# Patient Record
Sex: MALE | Race: WHITE | Employment: FULL TIME | URBAN - NONMETROPOLITAN AREA
[De-identification: names, ages, dates, MRNs, and addresses within clinical notes are randomized per-mention and may not be internally consistent; named-entity substitution may affect disease eponyms.]

---

## 2019-08-19 ENCOUNTER — TELEPHONE (OUTPATIENT)
Dept: FAMILY MEDICINE CLINIC | Age: 24
End: 2019-08-19

## 2019-10-01 ENCOUNTER — TELEPHONE (OUTPATIENT)
Dept: ADMINISTRATIVE | Age: 24
End: 2019-10-01

## 2020-05-01 ENCOUNTER — HOSPITAL ENCOUNTER (OUTPATIENT)
Age: 25
Discharge: HOME | End: 2020-05-03
Payer: COMMERCIAL

## 2020-05-01 ENCOUNTER — OFFICE VISIT (OUTPATIENT)
Dept: PRIMARY CARE CLINIC | Age: 25
End: 2020-05-01
Payer: COMMERCIAL

## 2020-05-01 VITALS
TEMPERATURE: 98.4 F | DIASTOLIC BLOOD PRESSURE: 80 MMHG | SYSTOLIC BLOOD PRESSURE: 136 MMHG | HEART RATE: 81 BPM | BODY MASS INDEX: 33.57 KG/M2 | OXYGEN SATURATION: 98 % | HEIGHT: 75 IN | WEIGHT: 270 LBS

## 2020-05-01 PROCEDURE — 99213 OFFICE O/P EST LOW 20 MIN: CPT | Performed by: FAMILY MEDICINE

## 2020-05-01 PROCEDURE — U0003 INFECTIOUS AGENT DETECTION BY NUCLEIC ACID (DNA OR RNA); SEVERE ACUTE RESPIRATORY SYNDROME CORONAVIRUS 2 (SARS-COV-2) (CORONAVIRUS DISEASE [COVID-19]), AMPLIFIED PROBE TECHNIQUE, MAKING USE OF HIGH THROUGHPUT TECHNOLOGIES AS DESCRIBED BY CMS-2020-01-R: HCPCS

## 2020-05-01 NOTE — PROGRESS NOTES
Estefany [facility]  1995    Chief Complaint   Patient presents with    Other     Pt was exposed to a positive covid. Pt has no symptoms        Respiratory Symptoms:  Patient has no complaints. Pt was exposed to his father who was positive for covid. Symptoms have been stable with time. He denies any other symptoms . He has to be tested to go to work. Relevant PMH: No pertinent PMH. Smoking history:  He  reports that he has been smoking. He does not have any smokeless tobacco history on file. He has had ill contacts with covid. Treatment to date: none. Travel screen completed:  Yes          Vitals:    05/01/20 1038   BP: 136/80   Pulse: 81   Temp: 98.4 °F (36.9 °C)   TempSrc: Oral   SpO2: 98%   Weight: 270 lb (122.5 kg)   Height: 6' 3\" (1.905 m)      Physical Exam  Vitals signs and nursing note reviewed. Constitutional:       General: He is not in acute distress. Appearance: Normal appearance. He is normal weight. HENT:      Head: Normocephalic and atraumatic. Right Ear: Tympanic membrane normal.      Left Ear: Tympanic membrane normal.      Nose: No congestion or rhinorrhea. Mouth/Throat:      Mouth: Mucous membranes are moist.      Pharynx: Oropharynx is clear. No posterior oropharyngeal erythema. Eyes:      Pupils: Pupils are equal, round, and reactive to light. Neck:      Musculoskeletal: Normal range of motion and neck supple. Cardiovascular:      Rate and Rhythm: Normal rate and regular rhythm. Heart sounds: Normal heart sounds. Pulmonary:      Breath sounds: Normal breath sounds. Lymphadenopathy:      Cervical: No cervical adenopathy. Neurological:      Mental Status: He is alert. Assessment/Plan:  Zulema James was seen today for other. Diagnoses and all orders for this visit:    Exposure to COVID-19 virus  -     COVID-19 Ambulatory;  Future             Gonzalez Hyde MD  5/1/20     This visit was provided as a focused evaluation during

## 2020-05-01 NOTE — LETTER
[address]  Phone: [phone]  Fax: [phone]    Lisa Hernadez MD        May 1, 2020     Patient: Lauren Toledo   YOB: 1995   Date of Visit: 5/1/2020       To Whom It May Concern: It is my medical opinion that Lauren Toledo should remain out of work until [employer] Database (RCD[employer]) are back and recommendations are given. If you have any questions or concerns, please don't hesitate to call.     Sincerely,        Lisa Hernadez MD

## 2020-05-02 LAB
SARS-COV-2: NOT DETECTED
SOURCE: NORMAL

## 2020-05-05 ENCOUNTER — TELEPHONE (OUTPATIENT)
Dept: ADMINISTRATIVE | Age: 25
End: 2020-05-05

## 2020-05-05 NOTE — TELEPHONE ENCOUNTER
Called pt to schedule 10-14 day f/u from 5/1 flu clinic visit. Pt does not have a pcp and was willing to schedule a virtual visit with Dr. Valdez Sol as his pcp. Pt had seen Dr. Anya Han in the past but is not able to reschedule with him per previous telephone encounter. Appt scheduled for 5/15/20 with Dr. Valdez Sol.

## 2020-05-15 ENCOUNTER — VIRTUAL VISIT (OUTPATIENT)
Dept: FAMILY MEDICINE CLINIC | Age: 25
End: 2020-05-15
Payer: COMMERCIAL

## 2020-05-15 VITALS — BODY MASS INDEX: 33.75 KG/M2 | WEIGHT: 270 LBS | TEMPERATURE: 97.8 F

## 2020-05-15 PROCEDURE — 99213 OFFICE O/P EST LOW 20 MIN: CPT | Performed by: FAMILY MEDICINE

## 2020-05-15 SDOH — HEALTH STABILITY: MENTAL HEALTH: HOW OFTEN DO YOU HAVE A DRINK CONTAINING ALCOHOL?: NEVER

## 2021-11-27 ENCOUNTER — APPOINTMENT (OUTPATIENT)
Dept: CT IMAGING | Age: 26
End: 2021-11-27
Payer: COMMERCIAL

## 2021-11-27 ENCOUNTER — HOSPITAL ENCOUNTER (EMERGENCY)
Age: 26
Discharge: HOME | End: 2021-11-27
Attending: EMERGENCY MEDICINE
Payer: COMMERCIAL

## 2021-11-27 ENCOUNTER — APPOINTMENT (OUTPATIENT)
Dept: GENERAL RADIOLOGY | Age: 26
End: 2021-11-27
Payer: COMMERCIAL

## 2021-11-27 VITALS
HEART RATE: 68 BPM | SYSTOLIC BLOOD PRESSURE: 141 MMHG | OXYGEN SATURATION: 97 % | HEIGHT: 75 IN | DIASTOLIC BLOOD PRESSURE: 79 MMHG | TEMPERATURE: 97.8 F | RESPIRATION RATE: 16 BRPM | BODY MASS INDEX: 36.06 KG/M2 | WEIGHT: 290 LBS

## 2021-11-27 DIAGNOSIS — G43.809 OTHER MIGRAINE WITHOUT STATUS MIGRAINOSUS, NOT INTRACTABLE: Primary | ICD-10-CM

## 2021-11-27 LAB
ANION GAP SERPL CALCULATED.3IONS-SCNC: 11 MMOL/L (ref 7–16)
BASOPHILS ABSOLUTE: 0.08 E9/L (ref 0–0.2)
BASOPHILS RELATIVE PERCENT: 1.1 % (ref 0–2)
BUN BLDV-MCNC: 9 MG/DL (ref 6–20)
CALCIUM SERPL-MCNC: 9.3 MG/DL (ref 8.6–10.2)
CHLORIDE BLD-SCNC: 101 MMOL/L (ref 98–107)
CO2: 23 MMOL/L (ref 22–29)
CREAT SERPL-MCNC: 0.8 MG/DL (ref 0.7–1.2)
EOSINOPHILS ABSOLUTE: 0.19 E9/L (ref 0.05–0.5)
EOSINOPHILS RELATIVE PERCENT: 2.6 % (ref 0–6)
GFR AFRICAN AMERICAN: >60
GFR NON-AFRICAN AMERICAN: >60 ML/MIN/1.73
GLUCOSE BLD-MCNC: 82 MG/DL (ref 74–99)
HCT VFR BLD CALC: 43.4 % (ref 37–54)
HEMOGLOBIN: 14.7 G/DL (ref 12.5–16.5)
IMMATURE GRANULOCYTES #: 0.05 E9/L
IMMATURE GRANULOCYTES %: 0.7 % (ref 0–5)
INR BLD: 1.1
LYMPHOCYTES ABSOLUTE: 2.44 E9/L (ref 1.5–4)
LYMPHOCYTES RELATIVE PERCENT: 32.8 % (ref 20–42)
MCH RBC QN AUTO: 27.6 PG (ref 26–35)
MCHC RBC AUTO-ENTMCNC: 33.9 % (ref 32–34.5)
MCV RBC AUTO: 81.6 FL (ref 80–99.9)
MONOCYTES ABSOLUTE: 0.53 E9/L (ref 0.1–0.95)
MONOCYTES RELATIVE PERCENT: 7.1 % (ref 2–12)
NEUTROPHILS ABSOLUTE: 4.16 E9/L (ref 1.8–7.3)
NEUTROPHILS RELATIVE PERCENT: 55.7 % (ref 43–80)
PDW BLD-RTO: 12.5 FL (ref 11.5–15)
PLATELET # BLD: 337 E9/L (ref 130–450)
PMV BLD AUTO: 9.4 FL (ref 7–12)
POTASSIUM REFLEX MAGNESIUM: 4.2 MMOL/L (ref 3.5–5)
PROTHROMBIN TIME: 11.9 SEC (ref 9.3–12.4)
RBC # BLD: 5.32 E12/L (ref 3.8–5.8)
SODIUM BLD-SCNC: 135 MMOL/L (ref 132–146)
TROPONIN, HIGH SENSITIVITY: <6 NG/L (ref 0–11)
WBC # BLD: 7.5 E9/L (ref 4.5–11.5)

## 2021-11-27 PROCEDURE — 80048 BASIC METABOLIC PNL TOTAL CA: CPT

## 2021-11-27 PROCEDURE — 6370000000 HC RX 637 (ALT 250 FOR IP): Performed by: STUDENT IN AN ORGANIZED HEALTH CARE EDUCATION/TRAINING PROGRAM

## 2021-11-27 PROCEDURE — 71045 X-RAY EXAM CHEST 1 VIEW: CPT

## 2021-11-27 PROCEDURE — 70496 CT ANGIOGRAPHY HEAD: CPT

## 2021-11-27 PROCEDURE — 85025 COMPLETE CBC W/AUTO DIFF WBC: CPT

## 2021-11-27 PROCEDURE — 85610 PROTHROMBIN TIME: CPT

## 2021-11-27 PROCEDURE — 0042T CT BRAIN PERFUSION: CPT

## 2021-11-27 PROCEDURE — 70498 CT ANGIOGRAPHY NECK: CPT

## 2021-11-27 PROCEDURE — 93005 ELECTROCARDIOGRAM TRACING: CPT | Performed by: STUDENT IN AN ORGANIZED HEALTH CARE EDUCATION/TRAINING PROGRAM

## 2021-11-27 PROCEDURE — 96375 TX/PRO/DX INJ NEW DRUG ADDON: CPT

## 2021-11-27 PROCEDURE — 84484 ASSAY OF TROPONIN QUANT: CPT

## 2021-11-27 PROCEDURE — 70450 CT HEAD/BRAIN W/O DYE: CPT

## 2021-11-27 PROCEDURE — G0426 INPT/ED TELECONSULT50: HCPCS | Performed by: PSYCHIATRY & NEUROLOGY

## 2021-11-27 PROCEDURE — 2580000003 HC RX 258: Performed by: STUDENT IN AN ORGANIZED HEALTH CARE EDUCATION/TRAINING PROGRAM

## 2021-11-27 PROCEDURE — 6360000004 HC RX CONTRAST MEDICATION: Performed by: RADIOLOGY

## 2021-11-27 PROCEDURE — 6360000002 HC RX W HCPCS: Performed by: STUDENT IN AN ORGANIZED HEALTH CARE EDUCATION/TRAINING PROGRAM

## 2021-11-27 PROCEDURE — 96374 THER/PROPH/DIAG INJ IV PUSH: CPT

## 2021-11-27 PROCEDURE — 99285 EMERGENCY DEPT VISIT HI MDM: CPT

## 2021-11-27 RX ORDER — 0.9 % SODIUM CHLORIDE 0.9 %
1000 INTRAVENOUS SOLUTION INTRAVENOUS ONCE
Status: COMPLETED | OUTPATIENT
Start: 2021-11-27 | End: 2021-11-27

## 2021-11-27 RX ORDER — DIPHENHYDRAMINE HYDROCHLORIDE 50 MG/ML
25 INJECTION INTRAMUSCULAR; INTRAVENOUS ONCE
Status: COMPLETED | OUTPATIENT
Start: 2021-11-27 | End: 2021-11-27

## 2021-11-27 RX ORDER — ACETAMINOPHEN 325 MG/1
650 TABLET ORAL ONCE
Status: COMPLETED | OUTPATIENT
Start: 2021-11-27 | End: 2021-11-27

## 2021-11-27 RX ORDER — PROCHLORPERAZINE EDISYLATE 5 MG/ML
10 INJECTION INTRAMUSCULAR; INTRAVENOUS ONCE
Status: COMPLETED | OUTPATIENT
Start: 2021-11-27 | End: 2021-11-27

## 2021-11-27 RX ADMIN — DIPHENHYDRAMINE HYDROCHLORIDE 25 MG: 50 INJECTION, SOLUTION INTRAMUSCULAR; INTRAVENOUS at 15:40

## 2021-11-27 RX ADMIN — SODIUM CHLORIDE 1000 ML: 9 INJECTION, SOLUTION INTRAVENOUS at 15:40

## 2021-11-27 RX ADMIN — IOPAMIDOL 100 ML: 755 INJECTION, SOLUTION INTRAVENOUS at 16:01

## 2021-11-27 RX ADMIN — ACETAMINOPHEN 650 MG: 325 TABLET ORAL at 15:42

## 2021-11-27 RX ADMIN — PROCHLORPERAZINE EDISYLATE 10 MG: 5 INJECTION INTRAMUSCULAR; INTRAVENOUS at 15:40

## 2021-11-27 ASSESSMENT — PAIN SCALES - GENERAL
PAINLEVEL_OUTOF10: 0
PAINLEVEL_OUTOF10: 3

## 2021-11-27 ASSESSMENT — ENCOUNTER SYMPTOMS
BACK PAIN: 0
SORE THROAT: 0
ABDOMINAL PAIN: 0
RHINORRHEA: 0
VOMITING: 0
NAUSEA: 0
SHORTNESS OF BREATH: 0
DIARRHEA: 0
COUGH: 0

## 2021-11-27 ASSESSMENT — PAIN DESCRIPTION - PAIN TYPE: TYPE: ACUTE PAIN

## 2021-11-27 ASSESSMENT — PAIN DESCRIPTION - LOCATION: LOCATION: HEAD

## 2021-11-27 NOTE — VIRTUAL HEALTH
Consults  Patient Location:  [address] [facility] Emergency Department    Provider Location ([facility]): [place], [place]    This virtual visit was conducted via interactive/real-time audio/video. [address] [facility]Th Floor Stroke and Vascular Neurology Consult for  [address] Stroke Alert through [address] @ 3:27pm  11/27/2021 3:29 PM  Pt Name: Sebas Schwab  MRN: [id number]  [place]: 1995  Date of evaluation: 11/27/2021  Primary Care Physician: Cal Kwon MD  Reason for Evaluation: Stroke Evaluation with Discussion with Ed or primary team with Telemedicine and stroke evaluation with Review of imaging and labs    Sebas Schwab is a 32 y.o. male with no apparent medical hx, mom had MS and migraine, came in for blurry vision and headache. Pt described his headache as sharp pain on the left frontal head with vision where he saw colorful lines. He usually has headache 1-2 times per year. But nothing like this. At the time I saw pt, all his symptoms resolved. Pt has no complaints at this time. LKW:   NIH:  0    Allergies  has No Known Allergies. Medications  Prior to Admission medications    Not on File    Scheduled Meds:  Continuous Infusions:  PRN Meds:.  Past Medical History   has a past medical history of ADHD (attention deficit hyperactivity disorder) and Depression.   Social History  Social History     Socioeconomic History    Marital status: Single     Spouse name: Not on file    Number of children: Not on file    Years of education: Not on file    Highest education level: Not on file   Occupational History    Not on file   Tobacco Use    Smoking status: Former Smoker     Packs/day: 0.50     Years: 3.00     Pack years: 1.50    Smokeless tobacco: Never Used   Vaping Use    Vaping Use: Every day    Substances: Nicotine   Substance and Sexual Activity    Alcohol use: Never    Drug use: Never    Sexual activity: Yes Partners: Female     Comment: GF   Other Topics Concern    Not on file   Social History Narrative    Not on file     Social Determinants of Health     Financial Resource Strain:     Difficulty of Paying Living Expenses: Not on file   Food Insecurity:     Worried About Running Out of Food in the Last Year: Not on file    Leslie of Food in the Last Year: Not on file   Transportation Needs:     Lack of Transportation (Medical): Not on file    Lack of Transportation (Non-Medical):  Not on file   Physical Activity:     Days of Exercise per Week: Not on file    Minutes of Exercise per Session: Not on file   Stress:     Feeling of Stress : Not on file   Social Connections:     Frequency of Communication with Friends and Family: Not on file    Frequency of Social Gatherings with Friends and Family: Not on file    Attends Protestant Services: Not on file    Active Member of [address] [place], [place] [address] or Organizations: Not on file    Attends Club or Organization Meetings: Not on file    Marital Status: Not on file   Intimate Partner Violence:     Fear of Current or Ex-Partner: Not on file    Emotionally Abused: Not on file    Physically Abused: Not on file    Sexually Abused: Not on file   Housing Stability:     Unable to Pay for Housing in the Last Year: Not on file    Number of [place] in the Last Year: Not on file    Unstable Housing in the Last Year: Not on file     Family History      Problem Relation Age of Onset    Mult Sclerosis Mother     Breast Cancer Mother         remission    Diabetes Father     Arthritis Father     High Blood Pressure Father     High Cholesterol Father     Pancreatic Cancer Maternal Grandmother     High Blood Pressure Paternal Grandfather        OBJECTIVE  BP (!) 157/102   Pulse 76   Temp 97.8 °F (36.6 °C) (Temporal)   Resp 14   Ht 6' 3\" (1.905 m)   Wt 290 lb (131.5 kg)   SpO2 97%   BMI 36.25 kg/m²        Pre-Morbid mRS: 0    Imaging:  Images were personally reviewed with VIZ. AKILA used to review images including:  CT brain without contrast: normal  CTA imaging: normal    CTP: normal    Assessment    32year old man with migraine aura. Recommendations:  1. NIH 0  2. Recommend Outpatient Neurology Consult for further assessment and evaluation   3.  consider . [id number]  4. Not a tPA candidate, symptoms resolved  5. I believe this is most likely a migraine aura, despite how infrequent it happens to pt, pt has strong family hx of it  6. I recommend pt to f/u with PCP and neurologist as outpatient to monitor his migraine        Discussed with ED Physician Dr Krystal Floyd, Dr. Landon Yang    At least 54 min of Telemedicine and time in conversation directly with ED staff and physician for the patient who is in imminent and life threatening deterioration without further treatment and evaluation. This Virtual Visit was conducted with patient's (and/or legal guardian's) consent, to provide telestroke consultation and necessary medical care.   Time spent examining patient, reviewing the images personally, reviewing the chart, perform high complexity decision making and speaking with the nursing staff regarding recommendations        Juanito Phillips MD  Stroke, Neurocritical Care And/or [address] [place] [address] Stroke [address]   Electronically signed 11/27/2021 at 3:29 PM

## 2021-11-27 NOTE — ED NOTES
Radiology Procedure Waiver   Name: Ritesh Barney  : 1995  MRN: [id number]    Date:  21    Time: 3:37 PM EST    Benefits of immediately proceeding with radiology exam(s) without pre-testing outweigh the risks or are not indicated as specified below and therefore the following is/are being waived:    [x] Benefits of immediate radiology exam(s) outweigh any risk. OR    Pre-exam testing is not indicated for the following reason(s):  [] Pregnancy test   [] Patients LMP on-time and regular.   [] Patient had Tubal Ligation or has other Contraception Device. [] Patient  is Menopausal or Premenarcheal.    [] Patient had Full or Partial Hysterectomy. [] Protocol for CT contrast allegry   [] Patient has tolerated well previously   [] Patient does not have a true allergy    [] MRI Questionnaire     [x] BUN/Creatinine   [] Patient age w/no hx of renal dysfunction. [] Patient on Dialysis. [] Recent Normal Labs.   Electronically signed by Vinicius Crespo DO on 21 at 3:37 PM NAHOMI Zhu DO  Resident  [phone]

## 2021-11-27 NOTE — PROGRESS NOTES
[address] [facility] called, Jace Judge RN. Patient symptoms: headache, blurred vision, facial numbness. Last well known:  1230 today 11/27/21  RUST:  1    Dr. Jose Nation spoke with Dr. Aysha Snowden.

## 2021-11-27 NOTE — ED PROVIDER NOTES
[facility]  Department of Emergency Medicine     Written by: Charley Gosselin, DO  Patient Name: Macey Rosa  Attending Provider: No att. providers found  Admit Date: 2021  1:53 PM  MRN: [id number]                   : 1995        Chief Complaint   Patient presents with    Headache    Blurred Vision     right eye. started 1.5 hours ago. - Chief complaint    HPI       Patient is a 49-year-old male presenting for evaluation of sudden onset left-sided anterior headache and blurry vision with spots and colors. Symptoms have improved but on initial onset were severe in severity, made worse with stretching/running, no specific alleviating factors, and began while patient was working at [employer]. Currently patient endorses mild blurred vision out of his right eye, denies any confusion, motor deficits anywhere; he does endorse slight decrease sensation of the left side of his face. He has never experienced anything like this before, does states that he has had headaches in the past but they have not felt like this and he has never had these type of neurologic deficits. Family history significant for migraine headaches in his mother and MS in his mother. Patient denies any trauma, he has not had any fevers, nausea or vomiting, chest pain or palpitations, neck pain or stiffness. His gait is normal and he has not had any difficulty with coordination. Patient symptoms began about 2.5 hours prior to arrival.  States that his headache is dull and aching and throbbing. He has not tried any medications for this. Review of Systems   Constitutional: Negative for chills and fever. HENT: Negative for rhinorrhea and sore throat. Eyes: Positive for visual disturbance. Respiratory: Negative for cough and shortness of breath. Cardiovascular: Negative for chest pain and palpitations. Gastrointestinal: Negative for abdominal pain, diarrhea, nausea and vomiting.    Endocrine: less than 2 seconds. Coloration: Skin is not jaundiced or pale. Findings: No rash. Neurological:      General: No focal deficit present. Mental Status: He is alert and oriented to person, place, and time. Cranial Nerves: No dysarthria or facial asymmetry. Sensory: Sensory deficit (left side face sensation diminished (subjectively per patient)) present. Motor: No weakness. Coordination: Coordination normal.      Gait: Gait normal.   Psychiatric:         Mood and Affect: Mood normal.         Behavior: Behavior normal.          Procedures       [facility]     ED Course as of 11/27/21 1917   Sat Nov 27, 2021   1638 Spoke with telestroke provider, they feel that patient does not need any further evaluation, suspect that this was complex migraine and feel that he may be treated and discharged to follow-up outpatient with his PCP and/or neurology; they do not recommend any further inpatient evaluation or treatment and do not feel that he needs any emergent imaging or any further emergent treatment or evaluation. [VG]   1639 Patient was given migraine cocktail of IV fluids, Compazine, Benadryl, Tylenol [VG]   4465  with significant provement of symptoms. His repeat neurologic exam is also improved. [VG]      ED Course User Index  [VG] Al Diaz DO       [facility]      This is a 58-year-old male presenting for evaluation of headache with visual disturbance and decreased in station of the left side of his face. On arrival he is alert and oriented, he is not toxic in appearance, he does not appear to be in any acute distress.   On initial examination patient does not appear to have any focal neurologic deficits, however on extremes of gaze he does appear to have single variable beats of horizontal nystagmus mostly towards the right and does seem to have diplopia on upward gaze; pupils equal round reactive bilaterally, there is no neck stiffness, no dysarthria and no facial droop; he does endorse diminished sensation to the left side of his face in all dermatomes. Into these findings stroke alert was called given the window in which patient symptoms began; CT studies did not show any acute abnormality or perfusion defect or large vessel occlusion; patient symptoms did start to improve during this encounter, he was treated with migraine cocktail. He was evaluated by the telestroke neurologist who deemed that this patient is likely suffering from complex migraine, per telemetry stroke neurologist patient does not need further inpatient evaluation for the symptoms and may follow-up on an outpatient basis with either his PCP, neurologist, or both. Patient symptoms significantly improved after migraine cocktail of IV fluids, Compazine, Benadryl, Tylenol. His repeat examinations were also improved and no longer had any blurry vision or decreased sensation. Labs reviewed, are generally unremarkable. Given these findings, feel patient is appropriate for discharge with instruction for outpatient follow-up. Strict return precautions were discussed. He was referred to outpatient neurology. He was also instructed to follow-up by calling his PCP office tomorrow morning. Results and plan were discussed with the patient and his father who is at bedside, they voiced understanding and are amenable. I have discussed this patient with my attending, who has seen the patient and agrees with this disposition. Patient was seen and evaluated by myself and my attending Vitor Tapia MD. Assessment and Plan discussed with attending provider, please see attestation for final plan of care.       --------------------------------------------- PAST HISTORY ---------------------------------------------  Past Medical History:  has a past medical history of ADHD (attention deficit hyperactivity disorder) and Depression.     Past Surgical History:  has a past surgical history that includes [place] tooth extraction. Social History:  reports that he has quit smoking. He has a 1.50 pack-year smoking history. He has never used smokeless tobacco. He reports that he does not drink alcohol and does not use drugs. Family History: family history includes Arthritis in his father; Breast Cancer in his mother; Diabetes in his father; High Blood Pressure in his father and paternal grandfather; High Cholesterol in his father; Mult Sclerosis in his mother; Pancreatic Cancer in his maternal grandmother. The patients home medications have been reviewed. Allergies: Patient has no known allergies.     -------------------------------------------------- RESULTS -------------------------------------------------  Labs:  Results for orders placed or performed during the hospital encounter of 11/27/21   CBC Auto Differential   Result Value Ref Range    WBC 7.5 4.5 - 11.5 E9/L    RBC 5.32 3.80 - 5.80 E12/L    Hemoglobin 14.7 12.5 - 16.5 g/dL    Hematocrit 43.4 37.0 - 54.0 %    MCV 81.6 80.0 - 99.9 fL    MCH 27.6 26.0 - 35.0 pg    MCHC 33.9 32.0 - 34.5 %    RDW 12.5 11.5 - 15.0 fL    Platelets 841 508 - 423 E9/L    MPV 9.4 7.0 - 12.0 fL    Neutrophils % 55.7 43.0 - 80.0 %    Immature Granulocytes % 0.7 0.0 - 5.0 %    Lymphocytes % 32.8 20.0 - 42.0 %    Monocytes % 7.1 2.0 - 12.0 %    Eosinophils % 2.6 0.0 - 6.0 %    Basophils % 1.1 0.0 - 2.0 %    Neutrophils Absolute 4.16 1.80 - 7.30 E9/L    Immature Granulocytes # 0.05 E9/L    Lymphocytes Absolute 2.44 1.50 - 4.00 E9/L    Monocytes Absolute 0.53 0.10 - 0.95 E9/L    Eosinophils Absolute 0.19 0.05 - 0.50 E9/L    Basophils Absolute 0.08 0.00 - 0.20 I6/N   Basic Metabolic Panel w/ Reflex to MG   Result Value Ref Range    Sodium 135 132 - 146 mmol/L    Potassium reflex Magnesium 4.2 3.5 - 5.0 mmol/L    Chloride 101 98 - 107 mmol/L    CO2 23 22 - 29 mmol/L    Anion Gap 11 7 - 16 mmol/L    Glucose 82 74 - 99 mg/dL    BUN 9 6 - 20 mg/dL    CREATININE 0.8 0.7 - 1.2 mg/dL    GFR Non-African American >60 >=60 mL/min/1.73    GFR African American >60     Calcium 9.3 8.6 - 10.2 mg/dL   Troponin   Result Value Ref Range    Troponin, High Sensitivity <6 0 - 11 ng/L   Protime-INR   Result Value Ref Range    Protime 11.9 9.3 - 12.4 sec    INR 1.1    EKG 12 Lead   Result Value Ref Range    Ventricular Rate 81 BPM    Atrial Rate 81 BPM    P-R Interval 148 ms    QRS Duration 98 ms    Q-T Interval 394 ms    QTc Calculation (Bazett) 457 ms    P Axis 58 degrees    R Axis 19 degrees    T Axis 42 degrees       Radiology:  XR CHEST PORTABLE   Final Result   No evidence of active cardiopulmonary pathology. CTA HEAD W CONTRAST   Final Result   1. No perfusion mismatch. 2. Unremarkable CTA of the head. 3. Unremarkable CTA of the neck. CTA NECK W CONTRAST   Final Result   1. No perfusion mismatch. 2. Unremarkable CTA of the head. 3. Unremarkable CTA of the neck. CT BRAIN PERFUSION   Final Result   1. No perfusion mismatch. 2. Unremarkable CTA of the head. 3. Unremarkable CTA of the neck. CT HEAD WO CONTRAST   Final Result   No acute intracranial abnormality. EKG:  EKG interpreted by the emergency department physician, [phone]:    Rate is 81; rhythm is sinus; interpretation is NSR, normal axis, , QRS 98, , no ST elevations, no abnormal T wave inversions. No previous EKGs available for comparison. ------------------------- NURSING NOTES AND VITALS REVIEWED ---------------------------  Date / Time Roomed:  11/27/2021  1:53 PM  ED Bed Assignment:  [id number]    The nursing notes within the ED encounter and vital signs as below have been reviewed.    BP (!) 141/79   Pulse 68   Temp 97.8 °F (36.6 °C) (Temporal)   Resp 16   Ht 6' 3\" (1.905 m)   Wt 290 lb (131.5 kg)   SpO2 97%   BMI 36.25 kg/m²   Oxygen Saturation Interpretation: Normal      ------------------------------------------ PROGRESS NOTES ------------------------------------------  5:40 PM EST  I have spoken with the patient and his father and discussed todays results, in addition to providing specific details for the plan of care and counseling regarding the diagnosis and prognosis. Their questions are answered at this time and they are agreeable with the plan. I discussed at length with them reasons for immediate return here for re evaluation. They will followup with their neurology referral and primary care physician by calling their office on Monday.      --------------------------------- ADDITIONAL PROVIDER NOTES ---------------------------------  At this time the patient is without objective evidence of an acute process requiring hospitalization or inpatient management. They have remained hemodynamically stable throughout their entire ED visit and are stable for discharge with outpatient follow-up. The plan has been discussed in detail and they are aware of the specific conditions for emergent return, as well as the importance of follow-up. There are no discharge medications for this patient. Diagnosis:  1. Other migraine without status migrainosus, not intractable        Disposition:  Patient's disposition: Discharge to home  Patient's condition is stable.        Adriano Shelter, DO  Resident  11/29/21 0100

## 2021-11-27 NOTE — ED PROVIDER NOTES
ATTENDING PROVIDER ATTESTATION:     Colin Jones presented to the emergency department for evaluation of Headache and Blurred Vision (right eye. started 1.5 hours ago.)   and was initially evaluated by the Medical Resident. See Original ED Note for H&P and ED course above. I have reviewed and discussed the case, including pertinent history (medical, surgical, family and social) and exam findings with the Medical Resident assigned to Colin Jones. I have personally performed and/or participated in the history, exam, medical decision making, and procedures and agree with all pertinent clinical information and any additional changes or corrections are noted below in my assessment and plan. I have discussed this patient in detail with the resident, and provided the instruction and education,     I have reviewed my findings and recommendations with the assigned Medical Resident, Colin Jones and members of family present at the time of disposition. I have performed a history and physical examination of this patient and directly supervised the resident caring for this patient    HPI  This is a 68-year-old male with no past medical history presents to the emergency department today with a chief complaint of headache and blurred vision. The patient states that 12:30 PM he had sudden onset headache of the left frontal region. It is dull in nature. It is nonradiating. Stretching seems to worsen the pain but nothing improves it. He is not tried any medication. He does not have history of migraines and has had one headache that is somewhat similar in the past.  He denies any trauma. His symptoms have been constant. No numbness, tingling or weakness. Denies any drainage from the eyes. No recent fevers, cough, chest pain, shortness of breath or abdominal pain. He denies any photophobia or phonophobia. Of note, the patient's mother has history of MS and migraines.   The patient states this is not the worst headache he has had in his life. In regards to his blurred vision, he describes it as some brightness and blurring. This has improved however. ROS  A complete review of systems was performed and pertinent positives and negatives are stated within HPI, all other systems reviewed and are negative.    --------------------------------------------- PAST HISTORY ---------------------------------------------  Past Medical History:  has a past medical history of ADHD (attention deficit hyperactivity disorder) and Depression. Past Surgical History:  has a past surgical history that includes [place] tooth extraction. Social History:  reports that he has quit smoking. He has a 1.50 pack-year smoking history. He has never used smokeless tobacco. He reports that he does not drink alcohol and does not use drugs. Family History: family history includes Arthritis in his father; Breast Cancer in his mother; Diabetes in his father; High Blood Pressure in his father and paternal grandfather; High Cholesterol in his father; Mult Sclerosis in his mother; Pancreatic Cancer in his maternal grandmother. Unless otherwise noted, family history is non contributory    The patients home medications have been reviewed. Allergies: Patient has no known allergies. Physical Exam  General: The patient is conversational and lying comfortably in bed. Head: Normocephalic and atraumatic. Eyes: Sclera non-icteric and no conjunctival injection. Globes are nonfirm. No drainage. ENT: Nasal and oral membranes moist  Neck: Trachea midline. No JVD  Respiratory: Lungs clear to auscultation bilaterally. Patient speaking in full sentences. Cardiovascular: Regular rate. No pedal edema. Gastrointestinal:  Abdomen is soft and nondistended. Bowel sounds present. There is no tenderness. There is no guarding or rebound. Musculoskeletal: No deformity  Skin: Skin warm and dry. No rashes. Neurologic: No gross motor deficits.   No aphasia. Pupils are equal and reactive to light. Extraocular eye movements do show horizontal nystagmus with fast beat to the left when looking laterally as well as vertically. Decreased sensation over the left face. Sensation intact over the body. Symmetric facies. Tongue protrudes midline. 5 out of 5 symmetric strength of the upper and lower extremities. Negative finger-to-nose testing. Alert and oriented. No meningismus. Patient able to ambulate  Psychiatric: Normal affect. MDM  This is a 32 y.o. male presenting to the ED for headache, blurred vision. On initial presentation, the patient's vital signs are interpreted as hypertensive, afebrile and saturating well on room air. Based on history and physical exam, we have a broad differential.  We are concerned for complex migraine, however with the patient's slightly abnormal neurological exam we cannot exclude intracranial process including CVA, TIA, hemorrhage or MS. We will also assess for metabolic causes of his symptoms. The patient's headache will be symptomatically treated with Tylenol, Compazine and Benadryl. The patient will be hydrated with a liter bolus. Stroke code will be activated as the patient's NIH is 1. Will obtain CT of the head, CTA and CT perfusion. Will obtain laboratory studies and an EKG as well as a chest x-ray. Neurology will be consulted. A 12-lead EKG was performed and interpreted by myself. The rate is 81 with normal sinus rhythm and normal axis. There is no ectopy. The OH interval is 148, QRS interval is 98, and QTC interval is 457. Q-wave in lead III. No acute ST elevation or depression. Good R wave progression. This is normal sinus rhythm with nonspecific abnormality. That he show electrolytes within normal limits. Troponin is negative. No leukocytosis or anemia. Coags within normal limits. CT of the head shows no acute intracranial abnormality.   CTA unremarkable, CT perfusion unremarkable. Chest x-ray shows no acute cardiopulmonary process. This is interpreted by radiology. On reevaluation, the patient's symptoms have completely resolved. He no longer has a headache or blurred vision. Telestroke, Dr. Miley Champion evaluated the patient and feel he is appropriate for outpatient follow-up. He does not require an MRI at this time. He feels this is likely complex migraine. The patient is instructed to follow with primary care physician as well as neurology. He is given strict return precautions. He has elevated blood pressure today and will require recheck. He and his father verbalized understanding and are agreeable to the plan. I directly supervised any procedures performed by the resident and was present for the procedure including all critical portions of the procedure    The cardiac monitor revealed sinus rhythm with a heart rate in the 60s as interpreted by me. The cardiac monitor was ordered secondary to the patient's headache and to monitor the patient for dysrhythmia. CPT [id number]    I, Dr. Milinda Boxer, am the primary provider of record    Impression  1.  Other migraine without status migrainosus, not intractable              Milinda Boxer, MD  11/28/21 9625

## 2021-11-27 NOTE — ED NOTES
Bed:  [place] [address]  Expected date:   Expected time:   Means of arrival:   Comments:  Marah Chávez RN  11/27/21 0568

## 2021-11-28 LAB
EKG ATRIAL RATE: 81 BPM
EKG P AXIS: 58 DEGREES
EKG P-R INTERVAL: 148 MS
EKG Q-T INTERVAL: 394 MS
EKG QRS DURATION: 98 MS
EKG QTC CALCULATION (BAZETT): 457 MS
EKG R AXIS: 19 DEGREES
EKG T AXIS: 42 DEGREES
EKG VENTRICULAR RATE: 81 BPM

## 2021-12-03 ENCOUNTER — OFFICE VISIT (OUTPATIENT)
Dept: FAMILY MEDICINE CLINIC | Age: 26
End: 2021-12-03
Payer: COMMERCIAL

## 2021-12-03 VITALS
WEIGHT: 292 LBS | TEMPERATURE: 97.9 F | DIASTOLIC BLOOD PRESSURE: 84 MMHG | OXYGEN SATURATION: 98 % | HEART RATE: 72 BPM | RESPIRATION RATE: 16 BRPM | BODY MASS INDEX: 36.5 KG/M2 | SYSTOLIC BLOOD PRESSURE: 136 MMHG

## 2021-12-03 DIAGNOSIS — G43.809 OTHER MIGRAINE WITHOUT STATUS MIGRAINOSUS, NOT INTRACTABLE: Primary | ICD-10-CM

## 2021-12-03 PROCEDURE — 99213 OFFICE O/P EST LOW 20 MIN: CPT | Performed by: PHYSICIAN ASSISTANT

## 2021-12-03 NOTE — PROGRESS NOTES
Chief Complaint:  ED Follow-up (11/27/21 seen in ER for headache)    History of Present Illness:  Source of history provided by:  patient. Patient presents for ED follow up   Was diagnosed with migraine on 11/27  Had CT head, brain perfusion, and CTA head and neck which were all normal  Labs and CXR were also normal  Headache cocktail in the ED treated headache  Has been resolved since ED visit   Denies personal history of recurrent migraines   Denies acute concerns today    Review of Systems: Unless otherwise stated in this report or unable to obtain because of the patient's clinical or mental status as evidenced by the medical record, this patients's positive and negative responses for Review of Systems, constitutional, psych, eyes, ENT, cardiovascular, respiratory, gastrointestinal, neurological, genitourinary, musculoskeletal, integument systems and systems related to the presenting problem are either stated in the preceding or were not pertinent or were negative for the symptoms and/or complaints related to the medical problem. Past Medical History:  has a past medical history of ADHD (attention deficit hyperactivity disorder) and Depression. Past Surgical History:  has a past surgical history that includes [place] tooth extraction. Social History:  reports that he has quit smoking. He has a 1.50 pack-year smoking history. He has never used smokeless tobacco. He reports that he does not drink alcohol and does not use drugs. Family History: family history includes Arthritis in his father; Breast Cancer in his mother; Diabetes in his father; High Blood Pressure in his father and paternal grandfather; High Cholesterol in his father; Mult Sclerosis in his mother; Pancreatic Cancer in his maternal grandmother. Allergies: Patient has no known allergies.     Physical Exam:  (Vital signs reviewed) /84   Pulse 72   Temp 97.9 °F (36.6 °C)   Resp 16   Wt 292 lb (132.5 kg)   SpO2 98%   BMI 36.50 kg/m² Oxygen Saturation Interpretation: Normal.   Constitutional:  Alert, development consistent with age. Eyes:  PERRL, EOMI, no discharge or conjunctival injection. Ears:  External ears without lesions. TM's clear without erythema or perforation bilaterally. Throat:  Pharynx without injection, exudate, or tonsillar hypertrophy. Airway patient. Neck:  Normal ROM. Supple. Lungs:  Clear to auscultation and breath sounds equal.  Heart:  Regular rate and rhythm, normal heart sounds, without pathological murmurs, ectopy, gallops, or rubs. Abdomen:  Soft, nontender, good bowel sounds. No firm or pulsatile mass. Back:  No costovertebral tenderness. Skin:  Normal turgor. Warm, dry, without visible rash, unless noted elsewhere. Neurological:  Alert and oriented.   Motor functions intact.    ------------------------------------------Test Results Section----------------------------------------------  (All laboratory and radiology results have been personally reviewed by myself)  Laboratory:  Results for orders placed or performed during the hospital encounter of 11/27/21   CBC Auto Differential   Result Value Ref Range    WBC 7.5 4.5 - 11.5 E9/L    RBC 5.32 3.80 - 5.80 E12/L    Hemoglobin 14.7 12.5 - 16.5 g/dL    Hematocrit 43.4 37.0 - 54.0 %    MCV 81.6 80.0 - 99.9 fL    MCH 27.6 26.0 - 35.0 pg    MCHC 33.9 32.0 - 34.5 %    RDW 12.5 11.5 - 15.0 fL    Platelets 702 040 - 575 E9/L    MPV 9.4 7.0 - 12.0 fL    Neutrophils % 55.7 43.0 - 80.0 %    Immature Granulocytes % 0.7 0.0 - 5.0 %    Lymphocytes % 32.8 20.0 - 42.0 %    Monocytes % 7.1 2.0 - 12.0 %    Eosinophils % 2.6 0.0 - 6.0 %    Basophils % 1.1 0.0 - 2.0 %    Neutrophils Absolute 4.16 1.80 - 7.30 E9/L    Immature Granulocytes # 0.05 E9/L    Lymphocytes Absolute 2.44 1.50 - 4.00 E9/L    Monocytes Absolute 0.53 0.10 - 0.95 E9/L    Eosinophils Absolute 0.19 0.05 - 0.50 E9/L    Basophils Absolute 0.08 0.00 - 0.20 M5/D   Basic Metabolic Panel w/ Reflex to MG Result Value Ref Range    Sodium 135 132 - 146 mmol/L    Potassium reflex Magnesium 4.2 3.5 - 5.0 mmol/L    Chloride 101 98 - 107 mmol/L    CO2 23 22 - 29 mmol/L    Anion Gap 11 7 - 16 mmol/L    Glucose 82 74 - 99 mg/dL    BUN 9 6 - 20 mg/dL    CREATININE 0.8 0.7 - 1.2 mg/dL    GFR Non-African American >60 >=60 mL/min/1.73    GFR African American >60     Calcium 9.3 8.6 - 10.2 mg/dL   Troponin   Result Value Ref Range    Troponin, High Sensitivity <6 0 - 11 ng/L   Protime-INR   Result Value Ref Range    Protime 11.9 9.3 - 12.4 sec    INR 1.1    EKG 12 Lead   Result Value Ref Range    Ventricular Rate 81 BPM    Atrial Rate 81 BPM    P-R Interval 148 ms    QRS Duration 98 ms    Q-T Interval 394 ms    QTc Calculation (Bazett) 457 ms    P Axis 58 degrees    R Axis 19 degrees    T Axis 42 degrees       Radiology: All Radiology results interpreted by Radiologist unless otherwise noted. -------------------------------------Impression & Disposition Section-----------------------------------  Impression(s):  Justyna Sierra was seen today for ed follow-up. Diagnoses and all orders for this visit:    Other migraine without status migrainosus, not intractable         -   Resolved. Not a chronic issue for patient. Discussed red flag symptoms. To call with any returning of symptoms.

## 2021-12-15 ENCOUNTER — TELEPHONE (OUTPATIENT)
Dept: FAMILY MEDICINE CLINIC | Age: 26
End: 2021-12-15

## 2021-12-15 ENCOUNTER — HOSPITAL ENCOUNTER (OUTPATIENT)
Dept: MRI IMAGING | Age: 26
Discharge: HOME | End: 2021-12-17
Payer: COMMERCIAL

## 2021-12-15 DIAGNOSIS — H53.8 BLURRED VISION: Primary | ICD-10-CM

## 2021-12-15 DIAGNOSIS — H53.8 BLURRED VISION: ICD-10-CM

## 2021-12-15 PROCEDURE — 6360000004 HC RX CONTRAST MEDICATION: Performed by: RADIOLOGY

## 2021-12-15 PROCEDURE — 70553 MRI BRAIN STEM W/O & W/DYE: CPT

## 2021-12-15 PROCEDURE — A9577 INJ MULTIHANCE: HCPCS | Performed by: RADIOLOGY

## 2021-12-15 RX ADMIN — GADOBENATE DIMEGLUMINE 20 ML: 529 INJECTION, SOLUTION INTRAVENOUS at 18:56

## 2021-12-15 NOTE — TELEPHONE ENCOUNTER
Ankita Winn called in regard to ER f/u[p 11/27/21 headache and blurred vision. He saw Maia Hankins [place], on 12/03/21 in office . He said that on Monday 12/13/21 he was having blurred vision with out headache. Wanted to get in to be seen. Talked with Dr. Enmanuel Quevedo as Dr. Wild Hernandez is out of office and she is ordering STAT MRI Brain W/O Contrast and we will get this set up.

## 2022-10-14 ENCOUNTER — OFFICE VISIT (OUTPATIENT)
Dept: FAMILY MEDICINE CLINIC | Age: 27
End: 2022-10-14
Payer: COMMERCIAL

## 2022-10-14 VITALS
BODY MASS INDEX: 30.21 KG/M2 | SYSTOLIC BLOOD PRESSURE: 148 MMHG | DIASTOLIC BLOOD PRESSURE: 96 MMHG | OXYGEN SATURATION: 98 % | HEIGHT: 75 IN | TEMPERATURE: 97.6 F | WEIGHT: 243 LBS | HEART RATE: 69 BPM

## 2022-10-14 DIAGNOSIS — E55.9 VITAMIN D DEFICIENCY, UNSPECIFIED: Primary | ICD-10-CM

## 2022-10-14 DIAGNOSIS — E55.9 VITAMIN D DEFICIENCY, UNSPECIFIED: ICD-10-CM

## 2022-10-14 DIAGNOSIS — Z00.00 ENCOUNTER FOR PREVENTIVE CARE: ICD-10-CM

## 2022-10-14 DIAGNOSIS — F32.A DEPRESSION, UNSPECIFIED DEPRESSION TYPE: ICD-10-CM

## 2022-10-14 DIAGNOSIS — Z23 NEED FOR VACCINATION: ICD-10-CM

## 2022-10-14 DIAGNOSIS — F32.A DEPRESSION, UNSPECIFIED DEPRESSION TYPE: Primary | ICD-10-CM

## 2022-10-14 LAB
ALBUMIN SERPL-MCNC: 4.8 G/DL (ref 3.5–5.2)
ALP BLD-CCNC: 120 U/L (ref 40–129)
ALT SERPL-CCNC: 46 U/L (ref 0–40)
AMORPHOUS: ABNORMAL
ANION GAP SERPL CALCULATED.3IONS-SCNC: 14 MMOL/L (ref 7–16)
AST SERPL-CCNC: 32 U/L (ref 0–39)
BACTERIA: ABNORMAL /HPF
BASOPHILS ABSOLUTE: 0.09 E9/L (ref 0–0.2)
BASOPHILS RELATIVE PERCENT: 1.2 % (ref 0–2)
BILIRUB SERPL-MCNC: 0.4 MG/DL (ref 0–1.2)
BILIRUBIN URINE: NEGATIVE
BLOOD, URINE: NEGATIVE
BUN BLDV-MCNC: 7 MG/DL (ref 6–20)
CALCIUM SERPL-MCNC: 9.4 MG/DL (ref 8.6–10.2)
CHLORIDE BLD-SCNC: 105 MMOL/L (ref 98–107)
CHOLESTEROL, TOTAL: 206 MG/DL (ref 0–199)
CLARITY: ABNORMAL
CO2: 22 MMOL/L (ref 22–29)
COLOR: YELLOW
CREAT SERPL-MCNC: 0.9 MG/DL (ref 0.7–1.2)
CRYSTALS, UA: ABNORMAL /HPF
EOSINOPHILS ABSOLUTE: 0.33 E9/L (ref 0.05–0.5)
EOSINOPHILS RELATIVE PERCENT: 4.3 % (ref 0–6)
GFR AFRICAN AMERICAN: >60
GFR NON-AFRICAN AMERICAN: >60 ML/MIN/1.73
GLUCOSE BLD-MCNC: 86 MG/DL (ref 74–99)
GLUCOSE URINE: NEGATIVE MG/DL
HCT VFR BLD CALC: 45.9 % (ref 37–54)
HDLC SERPL-MCNC: 32 MG/DL
HEMOGLOBIN: 15.5 G/DL (ref 12.5–16.5)
IMMATURE GRANULOCYTES #: 0.04 E9/L
IMMATURE GRANULOCYTES %: 0.5 % (ref 0–5)
KETONES, URINE: NEGATIVE MG/DL
LDL CHOLESTEROL CALCULATED: 129 MG/DL (ref 0–99)
LEUKOCYTE ESTERASE, URINE: NEGATIVE
LYMPHOCYTES ABSOLUTE: 2.61 E9/L (ref 1.5–4)
LYMPHOCYTES RELATIVE PERCENT: 34.1 % (ref 20–42)
MCH RBC QN AUTO: 28.7 PG (ref 26–35)
MCHC RBC AUTO-ENTMCNC: 33.8 % (ref 32–34.5)
MCV RBC AUTO: 85 FL (ref 80–99.9)
MONOCYTES ABSOLUTE: 0.65 E9/L (ref 0.1–0.95)
MONOCYTES RELATIVE PERCENT: 8.5 % (ref 2–12)
NEUTROPHILS ABSOLUTE: 3.93 E9/L (ref 1.8–7.3)
NEUTROPHILS RELATIVE PERCENT: 51.4 % (ref 43–80)
NITRITE, URINE: NEGATIVE
PDW BLD-RTO: 12.7 FL (ref 11.5–15)
PH UA: 6 (ref 5–9)
PLATELET # BLD: 339 E9/L (ref 130–450)
PMV BLD AUTO: 10.1 FL (ref 7–12)
POTASSIUM SERPL-SCNC: 4.3 MMOL/L (ref 3.5–5)
PROTEIN UA: NEGATIVE MG/DL
RBC # BLD: 5.4 E12/L (ref 3.8–5.8)
RBC UA: ABNORMAL /HPF (ref 0–2)
SODIUM BLD-SCNC: 141 MMOL/L (ref 132–146)
SPECIFIC GRAVITY UA: >=1.03 (ref 1–1.03)
TOTAL PROTEIN: 8 G/DL (ref 6.4–8.3)
TRIGL SERPL-MCNC: 226 MG/DL (ref 0–149)
TSH SERPL DL<=0.05 MIU/L-ACNC: 3.44 UIU/ML (ref 0.27–4.2)
UROBILINOGEN, URINE: 0.2 E.U./DL
VITAMIN D 25-HYDROXY: 17 NG/ML (ref 30–100)
VLDLC SERPL CALC-MCNC: 45 MG/DL
WBC # BLD: 7.7 E9/L (ref 4.5–11.5)
WBC UA: ABNORMAL /HPF (ref 0–5)

## 2022-10-14 PROCEDURE — 90674 CCIIV4 VAC NO PRSV 0.5 ML IM: CPT | Performed by: FAMILY MEDICINE

## 2022-10-14 PROCEDURE — 99395 PREV VISIT EST AGE 18-39: CPT | Performed by: FAMILY MEDICINE

## 2022-10-14 PROCEDURE — 90471 IMMUNIZATION ADMIN: CPT | Performed by: FAMILY MEDICINE

## 2022-10-14 PROCEDURE — G8482 FLU IMMUNIZE ORDER/ADMIN: HCPCS | Performed by: FAMILY MEDICINE

## 2022-10-14 RX ORDER — PAROXETINE 10 MG/1
10 TABLET, FILM COATED ORAL DAILY
Qty: 30 TABLET | Refills: 1 | Status: SHIPPED | OUTPATIENT
Start: 2022-10-14

## 2022-10-14 RX ORDER — ERGOCALCIFEROL 1.25 MG/1
50000 CAPSULE ORAL WEEKLY
Qty: 12 CAPSULE | Refills: 1 | Status: SHIPPED | OUTPATIENT
Start: 2022-10-14

## 2022-10-14 SDOH — ECONOMIC STABILITY: TRANSPORTATION INSECURITY
IN THE PAST 12 MONTHS, HAS LACK OF TRANSPORTATION KEPT YOU FROM MEETINGS, WORK, OR FROM GETTING THINGS NEEDED FOR DAILY LIVING?: NO

## 2022-10-14 SDOH — ECONOMIC STABILITY: HOUSING INSECURITY
IN THE LAST 12 MONTHS, WAS THERE A TIME WHEN YOU DID NOT HAVE A STEADY PLACE TO SLEEP OR SLEPT IN A SHELTER (INCLUDING NOW)?: NO

## 2022-10-14 SDOH — ECONOMIC STABILITY: INCOME INSECURITY: IN THE LAST 12 MONTHS, WAS THERE A TIME WHEN YOU WERE NOT ABLE TO PAY THE MORTGAGE OR RENT ON TIME?: NO

## 2022-10-14 SDOH — ECONOMIC STABILITY: FOOD INSECURITY: WITHIN THE PAST 12 MONTHS, YOU WORRIED THAT YOUR FOOD WOULD RUN OUT BEFORE YOU GOT MONEY TO BUY MORE.: OFTEN TRUE

## 2022-10-14 SDOH — ECONOMIC STABILITY: TRANSPORTATION INSECURITY
IN THE PAST 12 MONTHS, HAS THE LACK OF TRANSPORTATION KEPT YOU FROM MEDICAL APPOINTMENTS OR FROM GETTING MEDICATIONS?: NO

## 2022-10-14 SDOH — ECONOMIC STABILITY: HOUSING INSECURITY: IN THE LAST 12 MONTHS, HOW MANY PLACES HAVE YOU LIVED?: 2

## 2022-10-14 SDOH — ECONOMIC STABILITY: FOOD INSECURITY: WITHIN THE PAST 12 MONTHS, THE FOOD YOU BOUGHT JUST DIDN'T LAST AND YOU DIDN'T HAVE MONEY TO GET MORE.: SOMETIMES TRUE

## 2022-10-14 ASSESSMENT — PATIENT HEALTH QUESTIONNAIRE - PHQ9
SUM OF ALL RESPONSES TO PHQ QUESTIONS 1-9: 20
6. FEELING BAD ABOUT YOURSELF - OR THAT YOU ARE A FAILURE OR HAVE LET YOURSELF OR YOUR FAMILY DOWN: 3
SUM OF ALL RESPONSES TO PHQ QUESTIONS 1-9: 20
2. FEELING DOWN, DEPRESSED OR HOPELESS: 2
10. IF YOU CHECKED OFF ANY PROBLEMS, HOW DIFFICULT HAVE THESE PROBLEMS MADE IT FOR YOU TO DO YOUR WORK, TAKE CARE OF THINGS AT HOME, OR GET ALONG WITH OTHER PEOPLE: 2
7. TROUBLE CONCENTRATING ON THINGS, SUCH AS READING THE NEWSPAPER OR WATCHING TELEVISION: 3
SUM OF ALL RESPONSES TO PHQ QUESTIONS 1-9: 19
4. FEELING TIRED OR HAVING LITTLE ENERGY: 3
8. MOVING OR SPEAKING SO SLOWLY THAT OTHER PEOPLE COULD HAVE NOTICED. OR THE OPPOSITE, BEING SO FIGETY OR RESTLESS THAT YOU HAVE BEEN MOVING AROUND A LOT MORE THAN USUAL: 0
9. THOUGHTS THAT YOU WOULD BE BETTER OFF DEAD, OR OF HURTING YOURSELF: 1
SUM OF ALL RESPONSES TO PHQ9 QUESTIONS 1 & 2: 5
1. LITTLE INTEREST OR PLEASURE IN DOING THINGS: 3
5. POOR APPETITE OR OVEREATING: 2
3. TROUBLE FALLING OR STAYING ASLEEP: 3
SUM OF ALL RESPONSES TO PHQ QUESTIONS 1-9: 20

## 2022-10-14 ASSESSMENT — COLUMBIA-SUICIDE SEVERITY RATING SCALE - C-SSRS
1. WITHIN THE PAST MONTH, HAVE YOU WISHED YOU WERE DEAD OR WISHED YOU COULD GO TO SLEEP AND NOT WAKE UP?: YES
2. HAVE YOU ACTUALLY HAD ANY THOUGHTS OF KILLING YOURSELF?: NO
6. HAVE YOU EVER DONE ANYTHING, STARTED TO DO ANYTHING, OR PREPARED TO DO ANYTHING TO END YOUR LIFE?: YES
7. DID THIS OCCUR IN THE LAST THREE MONTHS: NO

## 2022-10-14 ASSESSMENT — SOCIAL DETERMINANTS OF HEALTH (SDOH): HOW HARD IS IT FOR YOU TO PAY FOR THE VERY BASICS LIKE FOOD, HOUSING, MEDICAL CARE, AND HEATING?: HARD

## 2022-10-14 NOTE — PROGRESS NOTES
CC: Thu Senior is a 32 y.o. yo male here for evaluation of the following medical concerns: Memory Loss (X a few months; asking for eval for thyroid problem//), Fatigue (Regardless of the amount of sleep he gets, he still feels tired.), and Annual Exam      HPI:    ADD: not on medical therapy; was on trial of adderall in past but stopped due to lost to follow up and then doctor left practice    Depression: uncontrolled; no SI/HI; feels not much of anything at all; not much happiness; experiences neg emotions more frequently; he feels his symptoms coincide with his ADHD but he does hot feel he has hyperactivity; he is not motivated (feels rock bottom); he has a gun at home; he has fleeting thoughts of harm but no plan and would never hurt himself or others, not wanting to hurt his family; he has similar reactions to medications as his father; his father has se of anger on wellbutrin and he prefers to never start that; no manic episodes or rare abnormal behaviors    Tobacco dependence:  quit cigs but he does vape    Adult Screening:  Blood pressure normotensive: Yes   Obesity (BMI 30+): No  Diabetes screen: NA   Statin for CVD events and mortality preventive medication: see orders  Aspirin for CVD and colon cancer preventive medication: see orders  Diet to prevent CV disease (adults with cardiovascular risk factors): NA; Patient's cardiovascular risk history includes: none  Alcohol use:   reports no history of alcohol use. Tobacco abuse:   reports that he has quit smoking. His smoking use included cigarettes. He has a 1.50 pack-year smoking history.  He has never used smokeless tobacco.  Depression screening: PHQ-9 Total Score: 20 (10/14/2022  8:58 AM)  Thoughts that you would be better off dead, or of hurting yourself in some way: 1 (10/14/2022  8:58 AM)  Annual lung cancer screening: NA   Colorectal Cancer Screening, average risk (50-75yrs): NA   Hx of CRC before age 57yrs in FDR: NA   Hep C virus infection screening: NA   Fall prevention: NA     Sexually active specific:   Sexual activity: single partner, contraception;  High risk behavior: none,  Sexually transmitted infections counseling: no    Male specific:  Abdominal aortic aneurysm screening (ages 72 to 76 years who have ever smoked): NA  Prostate: no concerns today  Difficulty with urine: No.      Health Maintenance Due   Topic Date Due    COVID-19 Vaccine (1) Never done    Hepatitis A vaccine (2 of 2 - 2-dose series) 02/20/2010    HIV screen  Never done    Hepatitis C screen  Never done    DTaP/Tdap/Td vaccine (6 - Td or Tdap) 08/14/2017     Immunization History   Administered Date(s) Administered    DTP 1995, 1995, 1995    DTaP vaccine 10/04/1996, 03/29/1998    Hepatitis A Ped/Adol (Havrix, Vaqta) 08/20/2009    Hepatitis B Ped/Adol (Engerix-B, Recombivax HB) 1995, 1995, 01/02/1996    Hib (HbOC) 1995, 1995, 1995, 07/17/1996    Influenza, FLUCELVAX, (age 10 mo+), MDCK, PF, 0.5mL 10/14/2022    MMR 04/03/1996, 03/29/1998    Meningococcal MCV4O (Menveo) 08/20/2013    Meningococcal MCV4P (Menactra) 08/14/2007    Polio IPV (IPOL) 03/29/2008    Polio OPV 1995, 1995, 1995    Tdap (Boostrix, Adacel) 08/14/2007    Varicella (Varivax) 10/16/1998, 03/29/2008       Past Medical History:   Diagnosis Date    ADHD (attention deficit hyperactivity disorder)     Depression      Past Surgical History:   Procedure Laterality Date    [place] TOOTH EXTRACTION       Family History   Problem Relation Age of Onset    Mult Sclerosis Mother     Breast Cancer Mother         remission    Diabetes Father     Arthritis Father     High Blood Pressure Father     High Cholesterol Father     Pancreatic Cancer Maternal Grandmother     High Blood Pressure Paternal Grandfather      Social History     Tobacco Use    Smoking status: Former     Packs/day: 0.50     Years: 3.00     Pack years: 1.50     Types: Cigarettes    Smokeless tobacco: Never   Vaping Use    Vaping Use: Every day    Substances: Nicotine   Substance Use Topics    Alcohol use: Never    Drug use: Never     No Known Allergies  Prior to Admission medications    Medication Sig Start Date End Date Taking? Authorizing Provider   PARoxetine (PAXIL) 10 MG tablet Take 1 tablet by mouth daily 10/14/22  Yes Natali Lopez MD       Vitals:  BP (!) 148/96 (Site: Left Upper Arm, Position: Sitting, Cuff Size: Large Adult)   Pulse 69   Temp 97.6 °F (36.4 °C) (Temporal)   Ht 6' 3\" (1.905 m)   Wt 243 lb (110.2 kg)   SpO2 98%   BMI 30.37 kg/m²   Wt Readings from Last 3 Encounters:   10/14/22 243 lb (110.2 kg)   12/03/21 292 lb (132.5 kg)   11/27/21 290 lb (131.5 kg)       Physical Exam:  Constitutional - alert, well appearing, and in no distress  Eyes - extraocular eye movements intact, left eye normal, right eye normal, no conjunctivitis noted  Neck - supple, no significant adenopathy; thyroid exam: thyroid is normal in size without nodules or tenderness  Respiratory- clear to auscultation, no wheezes, rales or rhonchi, symmetric air entry; no increased work of breathing  Cardiovascular - normal rate, regular rhythm, normal S1, S2, no murmurs, rubs, clicks or gallops; Extremities - no edema noted  Abdomen - soft, nontender, nondistended  Musculoskeletal -  no clubbing, cyanosis of extremities noted; no joint deformity or swelling noted  Skin - normal coloration and turgor, no rashes, no suspicious skin lesions noted  Neurological - alert, oriented; no obvious CN deficits, normal speech, no obvious focal findings noted  Psychiatric - depressed mood,  normal behavior, speech, dress; flat affect    Labs:  Pertinent labs, imaging, other diagnostic data and other clinician documentation reviewed in electronic medical record. Assessment / Plan   Diagnosis Orders   1.  Depression, unspecified depression type  Lipid Panel    CBC with Auto Differential    Comprehensive Metabolic Panel Vitamin D 25 Hydroxy    TSH    Urinalysis    HIV Screen    Hepatitis C Antibody    PARoxetine (PAXIL) 10 MG tablet    Gissel Gray, RENATO Lamb, [address], [place]      2. Encounter for preventive care  Lipid Panel    CBC with Auto Differential    Comprehensive Metabolic Panel    Vitamin D 25 Hydroxy    TSH    Urinalysis    HIV Screen    Hepatitis C Antibody      3. Vitamin D deficiency, unspecified  Vitamin D 25 Hydroxy      4. Need for vaccination  Influenza, FLUCELVAX, (age 10 mo+), IM, Preservative Free, 0.5 mL        Labs as ordered  Start paxil at this time  Declines wellbutrin  Prefers to treat depression and maybe later resume adderall if needed  I will refer to Adonis as well for help with counseling or establishing with psych if needed  Verbal contract  Close f/u    RTO: Return in about 6 weeks (around 11/25/2022) for depression.       An electronic signature was used to authenticate this note.  ---- Sruthi Bean MD on 10/14/2022 at 12:44 PM

## 2022-10-17 ENCOUNTER — TELEPHONE (OUTPATIENT)
Dept: FAMILY MEDICINE CLINIC | Age: 27
End: 2022-10-17

## 2022-10-17 LAB
HEPATITIS C ANTIBODY INTERPRETATION: NORMAL
HIV-1 AND HIV-2 ANTIBODIES: NORMAL

## 2022-10-17 NOTE — TELEPHONE ENCOUNTER
[facility] received referral in [facility]que for a diagnosis of depression. Pt also noted to have high PHQ and CSSR-s rating. Call placed to pt today. [facility] able to speak with pt directly. Mood and affect congruently pleasant, pt easily engaged in call. He was receptive to speaking with [facility] and did recognize the reason for the call. Pt has a prior hx of counseling. He believes it was at [facility], but unable to be sure (remembers it was at a facility in [place]). He is not currently in counseling at this time. Discussed CSSR-S and PHQ score with pt today. Pt notes that he has a support system that he considers to be positive through his girlfriend and his family. He notes that he is employed and has Tuesdays and Fridays off but finds that at night he does have difficulties and finds that he can experience depressing thoughts. He denies being actively SI/HI and states these thoughts are \"fleeting\" and he has a conversation with himself that he is able to bring himself out of them. Discussed recommendation for counseling. Pt not amenable at this time as he has a high copay through his insurance that he would not be able to afford these services. Discussed consideration of sliding scale fee for services at community counseling clinics which pt will consider. Safety planning completed today along with discussion of phone number for 65 and suicide hotline. Pt is able to contact his girlfriend if having a difficult day. Discussed with pt if he would be amenable to follow up with [facility] for check in on his day off on Tuesday to see how he is feeling as he recently started taking Paxil 10mg on 10/14. Pt agreeable to this. Plan made for [facility] to contact on Tuesday for follow up for consideration to sliding fee clinic and to check on overall mood and additional needs. Pt agreeable. No additional needs at this time. Will follow up with pt on Tuesday.   Kimberly Watson, MSW, LISW-S, OSW-C

## 2022-10-25 ENCOUNTER — TELEPHONE (OUTPATIENT)
Dept: FAMILY MEDICINE CLINIC | Age: 27
End: 2022-10-25

## 2022-10-25 NOTE — TELEPHONE ENCOUNTER
Supportive follow up call placed to pt today as agreed upon on 10/17. Pt again doing well today and in good spirits. He has remained compliant with his Paxil and notes that he has not experienced any side effects from this medication, nor has he noticed any change in his mood. Discussed with pt that he may not see full benefit of the medication for a few more weeks, but praised him for continued compliance. Pt notes that his girlfriend also takes medication and has been helpful in helping him maintain his compliance and supporting him with this. He notes that he has been feeling well thus far and remains aware of the community counseling options for sliding fee scale and [facility], but prefers medication route first. He does have [facility] contact information and will follow up with PCP if he notices any negative changes with new medication. Pt overall doing well and denies any further emotional concerns, HI/SI at this time. Will update PCP.  Jo Muir, MSW, LISW-S, OSW-C

## 2022-10-27 ENCOUNTER — TELEPHONE (OUTPATIENT)
Dept: FAMILY MEDICINE CLINIC | Age: 27
End: 2022-10-27

## 2022-10-27 NOTE — TELEPHONE ENCOUNTER
Relayed message from PCP re: increase in Paxil to 20mg if pt would like. Pt declined at this time, but will consider in future. Advised pt to update us should he increase dose.  Michael Reaves, MSW, LISW-S, OSW-C

## 2022-12-07 ENCOUNTER — OFFICE VISIT (OUTPATIENT)
Dept: FAMILY MEDICINE CLINIC | Age: 27
End: 2022-12-07
Payer: COMMERCIAL

## 2022-12-07 VITALS
SYSTOLIC BLOOD PRESSURE: 126 MMHG | BODY MASS INDEX: 36.22 KG/M2 | HEIGHT: 75 IN | HEART RATE: 82 BPM | TEMPERATURE: 97 F | OXYGEN SATURATION: 97 % | DIASTOLIC BLOOD PRESSURE: 80 MMHG | WEIGHT: 291.3 LBS

## 2022-12-07 DIAGNOSIS — F90.9 ATTENTION DEFICIT HYPERACTIVITY DISORDER (ADHD), UNSPECIFIED ADHD TYPE: ICD-10-CM

## 2022-12-07 DIAGNOSIS — F32.1 CURRENT MODERATE EPISODE OF MAJOR DEPRESSIVE DISORDER, UNSPECIFIED WHETHER RECURRENT (HCC): Primary | ICD-10-CM

## 2022-12-07 DIAGNOSIS — E78.5 HYPERLIPIDEMIA, UNSPECIFIED HYPERLIPIDEMIA TYPE: ICD-10-CM

## 2022-12-07 DIAGNOSIS — E55.9 VITAMIN D DEFICIENCY, UNSPECIFIED: ICD-10-CM

## 2022-12-07 PROCEDURE — 99214 OFFICE O/P EST MOD 30 MIN: CPT | Performed by: FAMILY MEDICINE

## 2022-12-07 PROCEDURE — 1036F TOBACCO NON-USER: CPT | Performed by: FAMILY MEDICINE

## 2022-12-07 PROCEDURE — G8427 DOCREV CUR MEDS BY ELIG CLIN: HCPCS | Performed by: FAMILY MEDICINE

## 2022-12-07 PROCEDURE — G8482 FLU IMMUNIZE ORDER/ADMIN: HCPCS | Performed by: FAMILY MEDICINE

## 2022-12-07 PROCEDURE — G8417 CALC BMI ABV UP PARAM F/U: HCPCS | Performed by: FAMILY MEDICINE

## 2022-12-07 RX ORDER — PAROXETINE 10 MG/1
10 TABLET, FILM COATED ORAL DAILY
Qty: 90 TABLET | Refills: 1 | Status: SHIPPED | OUTPATIENT
Start: 2022-12-07

## 2022-12-07 NOTE — PROGRESS NOTES
CC: Devon Mosqueda is a 32 y.o. yo male is here for evaluation evaluation for the following chronic medical concerns: Depression (F/u)      HPI:      Depression: uncontrolled; no SI/HI; feels not much of anything at all; not much happiness; experiences neg emotions more frequently; he feels his symptoms coincide with his ADHD but he does hot feel he has hyperactivity; he is not motivated (feels rock bottom); he has a gun at home; he has fleeting thoughts of harm but no plan and would never hurt himself or others, not wanting to hurt his family; he has similar reactions to medications as his father; his father has se of anger on wellbutrin and he prefers to never start that; no manic episodes or rare abnormal behaviors  Interval hx:  Becki Linn did reach out and provide some comm resources; he started paxil with improvement in symptoms; he prefers not to increase this at this time  He is still having some depression but overall not as much anhedonia as previous; others have noticed improvement in his mood as well  He would like to start counseling but he is limited by co-pays  No si/hi    HLD: not on medical therapy    Per prior:  ADD: not on medical therapy; was on trial of adderall in past but stopped due to lost to follow up and then doctor left practice;  he was on a high dose and preferred to remain off of this due to addictive tendencies     Tobacco dependence:  quit cigs but he does vape      Vitals:   /80   Pulse 82   Temp 97 °F (36.1 °C) (Temporal)   Ht 6' 3\" (1.905 m)   Wt 291 lb 4.8 oz (132.1 kg)   SpO2 97%   BMI 36.41 kg/m²   Wt Readings from Last 3 Encounters:   12/07/22 291 lb 4.8 oz (132.1 kg)   10/14/22 243 lb (110.2 kg)   12/03/21 292 lb (132.5 kg)       PE:  Constitutional - alert, well appearing, and in no distress  Eyes - extraocular eye movements intact, left eye normal, right eye normal, no conjunctivitis noted  Neck - symmetric, no obvious masses noted  Respiratory- clear to auscultation, no wheezes, rales or rhonchi, symmetric air entry; no increased work of breathing  Cardiovascular - normal rate, regular rhythm, normal S1, S2, no murmurs, rubs, clicks or gallops  Extremities - no edema noted  Abdomen - soft, nontender, nondistended  Skin - normal coloration and turgor, no rashes, no suspicious skin lesions noted  Neurological - no obvious CN deficits or focal neurological deficits  Psychiatric - alert, oriented; normal mood, behavior, speech, dress    A / P:     Diagnosis Orders   1. Current moderate episode of major depressive disorder, unspecified whether recurrent (HCC)  PARoxetine (PAXIL) 10 MG tablet      2. Attention deficit hyperactivity disorder (ADHD), unspecified ADHD type        3. Hyperlipidemia, unspecified hyperlipidemia type        4. Vitamin D deficiency, unspecified            Continue paxil  Work on diet and exercise  Call insurance to see who is covered from counseling if wanting to start  Verbal contract  Continue close f/u      RTO: Return in about 4 months (around 4/7/2023) for chronic disease / routine f/u.         An electronic signature was used to authenticate this note.  ---- Marlon Harrison MD on 12/7/2022 at 1:58 PM

## 2023-07-06 ENCOUNTER — OFFICE VISIT (OUTPATIENT)
Dept: FAMILY MEDICINE CLINIC | Age: 28
End: 2023-07-06
Payer: COMMERCIAL

## 2023-07-06 VITALS
HEART RATE: 82 BPM | WEIGHT: 298.4 LBS | SYSTOLIC BLOOD PRESSURE: 132 MMHG | OXYGEN SATURATION: 97 % | TEMPERATURE: 97.2 F | DIASTOLIC BLOOD PRESSURE: 88 MMHG | BODY MASS INDEX: 37.3 KG/M2

## 2023-07-06 DIAGNOSIS — F32.1 CURRENT MODERATE EPISODE OF MAJOR DEPRESSIVE DISORDER, UNSPECIFIED WHETHER RECURRENT (HCC): ICD-10-CM

## 2023-07-06 DIAGNOSIS — M62.838 MUSCLE SPASM: ICD-10-CM

## 2023-07-06 DIAGNOSIS — M25.561 CHRONIC PAIN OF RIGHT KNEE: Primary | ICD-10-CM

## 2023-07-06 DIAGNOSIS — G89.29 CHRONIC PAIN OF RIGHT KNEE: Primary | ICD-10-CM

## 2023-07-06 DIAGNOSIS — J30.89 ALLERGIC RHINITIS DUE TO OTHER ALLERGIC TRIGGER, UNSPECIFIED SEASONALITY: ICD-10-CM

## 2023-07-06 PROCEDURE — 1036F TOBACCO NON-USER: CPT | Performed by: FAMILY MEDICINE

## 2023-07-06 PROCEDURE — G8427 DOCREV CUR MEDS BY ELIG CLIN: HCPCS | Performed by: FAMILY MEDICINE

## 2023-07-06 PROCEDURE — G8417 CALC BMI ABV UP PARAM F/U: HCPCS | Performed by: FAMILY MEDICINE

## 2023-07-06 PROCEDURE — 99214 OFFICE O/P EST MOD 30 MIN: CPT | Performed by: FAMILY MEDICINE

## 2023-07-06 RX ORDER — TIZANIDINE 2 MG/1
2 TABLET ORAL NIGHTLY PRN
Qty: 30 TABLET | Refills: 0 | Status: SHIPPED | OUTPATIENT
Start: 2023-07-06

## 2023-07-06 RX ORDER — FLUTICASONE PROPIONATE 50 MCG
1 SPRAY, SUSPENSION (ML) NASAL DAILY
Qty: 32 G | Refills: 1 | Status: SHIPPED | OUTPATIENT
Start: 2023-07-06

## 2023-07-06 RX ORDER — PAROXETINE 10 MG/1
10 TABLET, FILM COATED ORAL DAILY
Qty: 90 TABLET | Refills: 1 | Status: SHIPPED | OUTPATIENT
Start: 2023-07-06

## 2023-07-06 ASSESSMENT — PATIENT HEALTH QUESTIONNAIRE - PHQ9
SUM OF ALL RESPONSES TO PHQ QUESTIONS 1-9: 15
10. IF YOU CHECKED OFF ANY PROBLEMS, HOW DIFFICULT HAVE THESE PROBLEMS MADE IT FOR YOU TO DO YOUR WORK, TAKE CARE OF THINGS AT HOME, OR GET ALONG WITH OTHER PEOPLE: 2
SUM OF ALL RESPONSES TO PHQ QUESTIONS 1-9: 15
SUM OF ALL RESPONSES TO PHQ QUESTIONS 1-9: 15
1. LITTLE INTEREST OR PLEASURE IN DOING THINGS: 2
4. FEELING TIRED OR HAVING LITTLE ENERGY: 3
5. POOR APPETITE OR OVEREATING: 0
3. TROUBLE FALLING OR STAYING ASLEEP: 2
8. MOVING OR SPEAKING SO SLOWLY THAT OTHER PEOPLE COULD HAVE NOTICED. OR THE OPPOSITE, BEING SO FIGETY OR RESTLESS THAT YOU HAVE BEEN MOVING AROUND A LOT MORE THAN USUAL: 1
SUM OF ALL RESPONSES TO PHQ QUESTIONS 1-9: 15
SUM OF ALL RESPONSES TO PHQ9 QUESTIONS 1 & 2: 3
6. FEELING BAD ABOUT YOURSELF - OR THAT YOU ARE A FAILURE OR HAVE LET YOURSELF OR YOUR FAMILY DOWN: 3
9. THOUGHTS THAT YOU WOULD BE BETTER OFF DEAD, OR OF HURTING YOURSELF: 0
2. FEELING DOWN, DEPRESSED OR HOPELESS: 1
7. TROUBLE CONCENTRATING ON THINGS, SUCH AS READING THE NEWSPAPER OR WATCHING TELEVISION: 3

## 2023-07-06 ASSESSMENT — LIFESTYLE VARIABLES
HOW MANY STANDARD DRINKS CONTAINING ALCOHOL DO YOU HAVE ON A TYPICAL DAY: 1 OR 2
HOW OFTEN DO YOU HAVE A DRINK CONTAINING ALCOHOL: MONTHLY OR LESS

## 2023-11-07 ENCOUNTER — OFFICE VISIT (OUTPATIENT)
Dept: FAMILY MEDICINE CLINIC | Age: 28
End: 2023-11-07
Payer: COMMERCIAL

## 2023-11-07 VITALS
WEIGHT: 304 LBS | HEIGHT: 75 IN | OXYGEN SATURATION: 98 % | BODY MASS INDEX: 37.8 KG/M2 | HEART RATE: 80 BPM | SYSTOLIC BLOOD PRESSURE: 138 MMHG | RESPIRATION RATE: 18 BRPM | DIASTOLIC BLOOD PRESSURE: 84 MMHG | TEMPERATURE: 97.1 F

## 2023-11-07 DIAGNOSIS — F90.9 ATTENTION DEFICIT HYPERACTIVITY DISORDER (ADHD), UNSPECIFIED ADHD TYPE: Primary | ICD-10-CM

## 2023-11-07 DIAGNOSIS — Z23 NEED FOR VACCINATION: ICD-10-CM

## 2023-11-07 DIAGNOSIS — F90.9 ATTENTION DEFICIT HYPERACTIVITY DISORDER (ADHD), UNSPECIFIED ADHD TYPE: ICD-10-CM

## 2023-11-07 PROCEDURE — G8417 CALC BMI ABV UP PARAM F/U: HCPCS | Performed by: FAMILY MEDICINE

## 2023-11-07 PROCEDURE — G8482 FLU IMMUNIZE ORDER/ADMIN: HCPCS | Performed by: FAMILY MEDICINE

## 2023-11-07 PROCEDURE — 99214 OFFICE O/P EST MOD 30 MIN: CPT | Performed by: FAMILY MEDICINE

## 2023-11-07 PROCEDURE — G8427 DOCREV CUR MEDS BY ELIG CLIN: HCPCS | Performed by: FAMILY MEDICINE

## 2023-11-07 PROCEDURE — 90471 IMMUNIZATION ADMIN: CPT | Performed by: FAMILY MEDICINE

## 2023-11-07 PROCEDURE — 90632 HEPA VACCINE ADULT IM: CPT | Performed by: FAMILY MEDICINE

## 2023-11-07 PROCEDURE — 1036F TOBACCO NON-USER: CPT | Performed by: FAMILY MEDICINE

## 2023-11-07 PROCEDURE — 90472 IMMUNIZATION ADMIN EACH ADD: CPT | Performed by: FAMILY MEDICINE

## 2023-11-07 PROCEDURE — 90674 CCIIV4 VAC NO PRSV 0.5 ML IM: CPT | Performed by: FAMILY MEDICINE

## 2023-11-07 RX ORDER — LISDEXAMFETAMINE DIMESYLATE CAPSULES 10 MG/1
10 CAPSULE ORAL EVERY MORNING
Qty: 30 CAPSULE | Refills: 0 | Status: SHIPPED | OUTPATIENT
Start: 2023-11-07 | End: 2023-12-07

## 2023-11-07 SDOH — ECONOMIC STABILITY: FOOD INSECURITY: WITHIN THE PAST 12 MONTHS, YOU WORRIED THAT YOUR FOOD WOULD RUN OUT BEFORE YOU GOT MONEY TO BUY MORE.: OFTEN TRUE

## 2023-11-07 SDOH — ECONOMIC STABILITY: INCOME INSECURITY: HOW HARD IS IT FOR YOU TO PAY FOR THE VERY BASICS LIKE FOOD, HOUSING, MEDICAL CARE, AND HEATING?: HARD

## 2023-11-07 SDOH — ECONOMIC STABILITY: FOOD INSECURITY: WITHIN THE PAST 12 MONTHS, THE FOOD YOU BOUGHT JUST DIDN'T LAST AND YOU DIDN'T HAVE MONEY TO GET MORE.: SOMETIMES TRUE

## 2023-11-07 NOTE — PROGRESS NOTES
CC: Nidhi Ly is a 29 y.o. yo male is here for evaluation evaluation for the following chronic medical concerns: Hyperlipidemia, Depression, ADHD, and Knee Pain (Feels like patella slipping out of place x1m/No injury )        HPI:      R knee pain:  On and off for years; flares every couple months; feels like when bending knee the knee will slip out and with certain movement willl having pain with transitioning; mainly R knee pain; medial aspect under the knee cap;    Interval hx:  Flare after using stool working on car; now it is an everyday pain; cannot squat; has to use knee brace daily; couple times felt the patella would get out of place again; tried some strengthening of the legs for the last couple weeks but no improvement as of yet; pain is under the patella and on the medial side of the R knee    ADD: not on medical therapy; was on trial of adderall in past but stopped due to lost to follow up and then doctor left practice;  he was on a high dose adderrall up to 30mg daily and he did not feel a huge benefit from this and he was concerned about becoming addicted to this so he stopped; he previously followed with [facility] NP was prescriber  Interval hx:  GF is pregnant now; he feels he is struggling at work and home and would like to get his life together and resume low dose medical therapy     Depression: uncontrolled; see previous notes; his father has se of anger on wellbutrin and he prefers to never start that; no si/hi; Symptoms controlled on paxil 10mg; counseling limited by co-pays  Interval hx:  Did ok on paxil but did not get it filled; now has been doing ok off the paxil and prefers to remain off of this at this time    HLD: not on medical therapy    PDMP Monitoring:    Last PDMP Musa Iyer as Reviewed:  Review User Review Instant Review Result   MIGNON NESBITT 11/7/2023  9:59 AM Reviewed PDMP [1]     Urine tox: ** today  Contract: **today    Per prior:  Tobacco dependence:

## 2023-11-08 LAB
6-MONOACETYLMORPHINE, URINE: NEGATIVE
ABNORMAL SPECIMEN VALIDITY TEST: ABNORMAL
ALCOHOL URINE: NOT DETECTED MG/DL
AMPHETAMINE SCREEN URINE: NEGATIVE
BARBITURATE SCREEN URINE: NEGATIVE
BENZODIAZEPINE SCREEN, URINE: NEGATIVE
BUPRENORPHINE URINE: NEGATIVE
CANNABINOID SCREEN URINE: NEGATIVE
COCAINE METABOLITE, URINE: NEGATIVE
FENTANYL URINE: NEGATIVE
INTEGRITY CHECK, CREATININE, URINE: 237.2 MG/DL (ref 22–250)
INTEGRITY CHECK, OXIDANT, URINE: <40 MG/L
INTEGRITY CHECK, PH, URINE: 5.2 (ref 4.5–9)
INTEGRITY CHECK, SPECIFIC GRAVITY, URINE: 1.03 (ref 1–1.03)
METHADONE SCREEN, URINE: NEGATIVE
OPIATES, URINE: NEGATIVE
OXYCODONE SCREEN URINE: NEGATIVE
PHENCYCLIDINE, URINE: NEGATIVE
TEST INFORMATION: ABNORMAL
TRAMADOL, URINE: NEGATIVE

## 2023-11-09 LAB
7-AMINOCLONAZEPAM, QUANTITATIVE, URINE: <50 NG/ML
ALPHA-HYDROXYALPRAZOLAM, QUANTITATIVE, URINE: <50 NG/ML
ALPHA-HYDROXYMIDAZOLAM, QUANTITATIVE, URINE: <50 NG/ML
ALPHA-HYDROXYTRIAZOLAM, QUANTITATIVE, URINE: <50 NG/ML
ALPRAZOLAM URINE QUANT: <50 NG/ML
CHLORDIAZEPOXIDE, QUANTITATIVE, URINE: <50 NG/ML
CLONAZEPAM, QUANTITATIVE, URINE: <50 NG/ML
COMPLIANCE DRUG ANALYSIS, URINE: NORMAL
DIAZEPAM URINE QUANT: <50 NG/ML
FLUNITRAZEPAM, QUANTITATIVE, URINE: <50 NG/ML
FLURAZEPAM, QUANTITATIVE, URINE: <50 NG/ML
LORAZEPAM URINE QUANT: <50 NG/ML
MIDAZOLAM URINE QUANT: <50 NG/ML
NORDIAZEPAM URINE QUANT: 64.6 NG/ML
OXAZEPAM URINE QUANT: 127.2 NG/ML
TEMAZEPAM, QUANTITATIVE, URINE: 195.3 NG/ML

## 2024-07-19 ENCOUNTER — PATIENT MESSAGE (OUTPATIENT)
Dept: FAMILY MEDICINE CLINIC | Age: 29
End: 2024-07-19

## 2024-07-22 NOTE — TELEPHONE ENCOUNTER
From: Colin Luz  To: Dr. Nain Rose  Sent: 7/19/2024 4:39 PM EDT  Subject: Blood type    I'm curious if my blood has been typed and if that information was available.

## 2024-11-26 ENCOUNTER — HOSPITAL ENCOUNTER (OUTPATIENT)
Age: 29
Discharge: HOME | End: 2024-11-26
Payer: COMMERCIAL

## 2024-11-26 ENCOUNTER — OFFICE VISIT (OUTPATIENT)
Dept: FAMILY MEDICINE CLINIC | Age: 29
End: 2024-11-26
Payer: COMMERCIAL

## 2024-11-26 ENCOUNTER — HOSPITAL ENCOUNTER (OUTPATIENT)
Dept: CT IMAGING | Age: 29
Discharge: HOME | End: 2024-11-28
Payer: COMMERCIAL

## 2024-11-26 VITALS
DIASTOLIC BLOOD PRESSURE: 100 MMHG | WEIGHT: 315 LBS | SYSTOLIC BLOOD PRESSURE: 140 MMHG | RESPIRATION RATE: 27 BRPM | BODY MASS INDEX: 39.17 KG/M2 | HEART RATE: 90 BPM | HEIGHT: 75 IN | TEMPERATURE: 98.3 F | OXYGEN SATURATION: 98 %

## 2024-11-26 DIAGNOSIS — E78.5 HYPERLIPIDEMIA, UNSPECIFIED HYPERLIPIDEMIA TYPE: ICD-10-CM

## 2024-11-26 DIAGNOSIS — R10.31 RLQ ABDOMINAL PAIN: ICD-10-CM

## 2024-11-26 DIAGNOSIS — R03.0 ELEVATED BLOOD PRESSURE READING: ICD-10-CM

## 2024-11-26 DIAGNOSIS — Z13.31 POSITIVE DEPRESSION SCREENING: ICD-10-CM

## 2024-11-26 DIAGNOSIS — R10.31 RLQ ABDOMINAL PAIN: Primary | ICD-10-CM

## 2024-11-26 LAB
ALBUMIN SERPL-MCNC: 4.4 G/DL (ref 3.5–5.2)
ALP SERPL-CCNC: 101 U/L (ref 40–129)
ALT SERPL-CCNC: 36 U/L (ref 0–40)
ANION GAP SERPL CALCULATED.3IONS-SCNC: 11 MMOL/L (ref 7–16)
AST SERPL-CCNC: 21 U/L (ref 0–39)
BASOPHILS # BLD: 0.1 K/UL (ref 0–0.2)
BASOPHILS NFR BLD: 1 % (ref 0–2)
BILIRUB SERPL-MCNC: 0.6 MG/DL (ref 0–1.2)
BUN SERPL-MCNC: 10 MG/DL (ref 6–20)
CALCIUM SERPL-MCNC: 9.7 MG/DL (ref 8.6–10.2)
CHLORIDE SERPL-SCNC: 104 MMOL/L (ref 98–107)
CHOLEST SERPL-MCNC: 162 MG/DL
CO2 SERPL-SCNC: 25 MMOL/L (ref 22–29)
CREAT SERPL-MCNC: 0.9 MG/DL (ref 0.7–1.2)
EOSINOPHIL # BLD: 0.16 K/UL (ref 0.05–0.5)
EOSINOPHILS RELATIVE PERCENT: 2 % (ref 0–6)
ERYTHROCYTE [DISTWIDTH] IN BLOOD BY AUTOMATED COUNT: 13 % (ref 11.5–15)
GFR, ESTIMATED: >90 ML/MIN/1.73M2
GLUCOSE SERPL-MCNC: 79 MG/DL (ref 74–99)
HCT VFR BLD AUTO: 42 % (ref 37–54)
HDLC SERPL-MCNC: 34 MG/DL
HGB BLD-MCNC: 13.9 G/DL (ref 12.5–16.5)
IMM GRANULOCYTES # BLD AUTO: 0.08 K/UL (ref 0–0.58)
IMM GRANULOCYTES NFR BLD: 1 % (ref 0–5)
LDLC SERPL CALC-MCNC: 86 MG/DL
LIPASE SERPL-CCNC: 18 U/L (ref 13–60)
LYMPHOCYTES NFR BLD: 2.44 K/UL (ref 1.5–4)
LYMPHOCYTES RELATIVE PERCENT: 28 % (ref 20–42)
MCH RBC QN AUTO: 26.9 PG (ref 26–35)
MCHC RBC AUTO-ENTMCNC: 33.1 G/DL (ref 32–34.5)
MCV RBC AUTO: 81.2 FL (ref 80–99.9)
MONOCYTES NFR BLD: 0.68 K/UL (ref 0.1–0.95)
MONOCYTES NFR BLD: 8 % (ref 2–12)
NEUTROPHILS NFR BLD: 60 % (ref 43–80)
NEUTS SEG NFR BLD: 5.19 K/UL (ref 1.8–7.3)
PLATELET # BLD AUTO: 320 K/UL (ref 130–450)
PMV BLD AUTO: 9.3 FL (ref 7–12)
POTASSIUM SERPL-SCNC: 3.7 MMOL/L (ref 3.5–5)
PROT SERPL-MCNC: 7.5 G/DL (ref 6.4–8.3)
RBC # BLD AUTO: 5.17 M/UL (ref 3.8–5.8)
SODIUM SERPL-SCNC: 140 MMOL/L (ref 132–146)
TRIGL SERPL-MCNC: 210 MG/DL
TSH SERPL DL<=0.05 MIU/L-ACNC: 2.4 UIU/ML (ref 0.27–4.2)
VLDLC SERPL CALC-MCNC: 42 MG/DL
WBC OTHER # BLD: 8.7 K/UL (ref 4.5–11.5)

## 2024-11-26 PROCEDURE — 83690 ASSAY OF LIPASE: CPT

## 2024-11-26 PROCEDURE — 36415 COLL VENOUS BLD VENIPUNCTURE: CPT

## 2024-11-26 PROCEDURE — 99214 OFFICE O/P EST MOD 30 MIN: CPT | Performed by: PHYSICIAN ASSISTANT

## 2024-11-26 PROCEDURE — 74177 CT ABD & PELVIS W/CONTRAST: CPT

## 2024-11-26 PROCEDURE — 80053 COMPREHEN METABOLIC PANEL: CPT

## 2024-11-26 PROCEDURE — 6360000004 HC RX CONTRAST MEDICATION: Performed by: RADIOLOGY

## 2024-11-26 PROCEDURE — 85025 COMPLETE CBC W/AUTO DIFF WBC: CPT

## 2024-11-26 PROCEDURE — 80061 LIPID PANEL: CPT

## 2024-11-26 PROCEDURE — 84443 ASSAY THYROID STIM HORMONE: CPT

## 2024-11-26 RX ORDER — IOPAMIDOL 755 MG/ML
18 INJECTION, SOLUTION INTRAVASCULAR
Status: COMPLETED | OUTPATIENT
Start: 2024-11-26 | End: 2024-11-26

## 2024-11-26 RX ORDER — IOPAMIDOL 755 MG/ML
75 INJECTION, SOLUTION INTRAVASCULAR
Status: COMPLETED | OUTPATIENT
Start: 2024-11-26 | End: 2024-11-26

## 2024-11-26 RX ORDER — SODIUM CHLORIDE 0.9 % (FLUSH) 0.9 %
10 SYRINGE (ML) INJECTION
Status: ACTIVE | OUTPATIENT
Start: 2024-11-26 | End: 2024-11-27

## 2024-11-26 RX ADMIN — IOPAMIDOL 18 ML: 755 INJECTION, SOLUTION INTRAVENOUS at 13:30

## 2024-11-26 RX ADMIN — IOPAMIDOL 75 ML: 755 INJECTION, SOLUTION INTRAVENOUS at 13:30

## 2024-11-26 SDOH — ECONOMIC STABILITY: FOOD INSECURITY: WITHIN THE PAST 12 MONTHS, YOU WORRIED THAT YOUR FOOD WOULD RUN OUT BEFORE YOU GOT MONEY TO BUY MORE.: OFTEN TRUE

## 2024-11-26 SDOH — ECONOMIC STABILITY: FOOD INSECURITY: WITHIN THE PAST 12 MONTHS, THE FOOD YOU BOUGHT JUST DIDN'T LAST AND YOU DIDN'T HAVE MONEY TO GET MORE.: SOMETIMES TRUE

## 2024-11-26 SDOH — ECONOMIC STABILITY: INCOME INSECURITY: HOW HARD IS IT FOR YOU TO PAY FOR THE VERY BASICS LIKE FOOD, HOUSING, MEDICAL CARE, AND HEATING?: VERY HARD

## 2024-11-26 ASSESSMENT — PATIENT HEALTH QUESTIONNAIRE - PHQ9
SUM OF ALL RESPONSES TO PHQ QUESTIONS 1-9: 18
SUM OF ALL RESPONSES TO PHQ QUESTIONS 1-9: 18
1. LITTLE INTEREST OR PLEASURE IN DOING THINGS: NEARLY EVERY DAY
7. TROUBLE CONCENTRATING ON THINGS, SUCH AS READING THE NEWSPAPER OR WATCHING TELEVISION: NEARLY EVERY DAY
9. THOUGHTS THAT YOU WOULD BE BETTER OFF DEAD, OR OF HURTING YOURSELF: NOT AT ALL
6. FEELING BAD ABOUT YOURSELF - OR THAT YOU ARE A FAILURE OR HAVE LET YOURSELF OR YOUR FAMILY DOWN: NEARLY EVERY DAY
SUM OF ALL RESPONSES TO PHQ QUESTIONS 1-9: 18
SUM OF ALL RESPONSES TO PHQ QUESTIONS 1-9: 18
SUM OF ALL RESPONSES TO PHQ9 QUESTIONS 1 & 2: 6
5. POOR APPETITE OR OVEREATING: NEARLY EVERY DAY
3. TROUBLE FALLING OR STAYING ASLEEP: NOT AT ALL
10. IF YOU CHECKED OFF ANY PROBLEMS, HOW DIFFICULT HAVE THESE PROBLEMS MADE IT FOR YOU TO DO YOUR WORK, TAKE CARE OF THINGS AT HOME, OR GET ALONG WITH OTHER PEOPLE: SOMEWHAT DIFFICULT
8. MOVING OR SPEAKING SO SLOWLY THAT OTHER PEOPLE COULD HAVE NOTICED. OR THE OPPOSITE, BEING SO FIGETY OR RESTLESS THAT YOU HAVE BEEN MOVING AROUND A LOT MORE THAN USUAL: NOT AT ALL
4. FEELING TIRED OR HAVING LITTLE ENERGY: NEARLY EVERY DAY
2. FEELING DOWN, DEPRESSED OR HOPELESS: NEARLY EVERY DAY

## 2024-11-26 NOTE — PROGRESS NOTES
Procedure Laterality Date    WISDOM TOOTH EXTRACTION       Social History:  reports that he has quit smoking. His smoking use included cigarettes. He has a 1.5 pack-year smoking history. He has never used smokeless tobacco. He reports that he does not drink alcohol and does not use drugs.  Family History: family history includes Arthritis in his father; Breast Cancer in his mother; Diabetes in his father; High Blood Pressure in his father and paternal grandfather; High Cholesterol in his father; Mult Sclerosis in his mother; Pancreatic Cancer in his maternal grandmother.   Allergies: Patient has no known allergies.    Physical Exam         VS:  BP (!) 140/100 (Site: Right Upper Arm, Position: Sitting, Cuff Size: Large Adult)   Pulse 90   Temp 98.3 °F (36.8 °C) (Oral)   Resp 27   Ht 1.905 m (6' 3\")   Wt (!) 143.2 kg (315 lb 12.8 oz)   SpO2 98%   BMI 39.47 kg/m²    Oxygen Saturation Interpretation: Normal.    General Appearance/Constitutional:  Alert, development consistent with age, NAD.  HEENT:  NCAT.   Lungs: CTAB without wheezing, rales, or rhonchi.  Heart:  RRR, no murmurs, rubs, or gallops.  Abdomen:  General Appearance: No obvious trauma or bruising. No rashes or lesions.       Bowel sounds: BS+x4       Distension:  No distension.          Tenderness: RLQ TTP, non-distended without guarding, rebound, or rigidity.       Liver/Spleen: Non-tender and no hepatosplenomegaly.        Pulsatile Mass: None noted.   Back: CVA Tenderness: No bilateral tenderness or bruising.   Skin:  Normal turgor.  Warm, dry, without visible rash, unless noted elsewhere.  Neurological:  Orientation age-appropriate.  Motor functions intact.    Lab / Imaging Results   (All laboratory and radiology results have been personally reviewed by myself)  Labs:  Imaging:  All Radiology results interpreted by Radiologist unless otherwise noted.    Assessment / Plan     Impression(s):  1. RLQ abdominal pain    2. Elevated blood pressure

## 2024-12-12 ENCOUNTER — OFFICE VISIT (OUTPATIENT)
Dept: FAMILY MEDICINE CLINIC | Age: 29
End: 2024-12-12
Payer: COMMERCIAL

## 2024-12-12 VITALS
WEIGHT: 313.6 LBS | SYSTOLIC BLOOD PRESSURE: 158 MMHG | HEART RATE: 80 BPM | BODY MASS INDEX: 39.2 KG/M2 | OXYGEN SATURATION: 98 % | TEMPERATURE: 97.5 F | RESPIRATION RATE: 20 BRPM | DIASTOLIC BLOOD PRESSURE: 90 MMHG

## 2024-12-12 DIAGNOSIS — F32.1 CURRENT MODERATE EPISODE OF MAJOR DEPRESSIVE DISORDER, UNSPECIFIED WHETHER RECURRENT (HCC): ICD-10-CM

## 2024-12-12 DIAGNOSIS — E78.5 HYPERLIPIDEMIA, UNSPECIFIED HYPERLIPIDEMIA TYPE: ICD-10-CM

## 2024-12-12 DIAGNOSIS — F90.9 ATTENTION DEFICIT HYPERACTIVITY DISORDER (ADHD), UNSPECIFIED ADHD TYPE: ICD-10-CM

## 2024-12-12 DIAGNOSIS — I10 ESSENTIAL HYPERTENSION: Primary | ICD-10-CM

## 2024-12-12 PROCEDURE — 93000 ELECTROCARDIOGRAM COMPLETE: CPT | Performed by: FAMILY MEDICINE

## 2024-12-12 PROCEDURE — 3080F DIAST BP >= 90 MM HG: CPT | Performed by: FAMILY MEDICINE

## 2024-12-12 PROCEDURE — 3077F SYST BP >= 140 MM HG: CPT | Performed by: FAMILY MEDICINE

## 2024-12-12 PROCEDURE — 99214 OFFICE O/P EST MOD 30 MIN: CPT | Performed by: FAMILY MEDICINE

## 2024-12-12 RX ORDER — AMLODIPINE BESYLATE 2.5 MG/1
2.5 TABLET ORAL DAILY
Qty: 30 TABLET | Refills: 3 | Status: SHIPPED | OUTPATIENT
Start: 2024-12-12

## 2024-12-12 RX ORDER — PAROXETINE 10 MG/1
10 TABLET, FILM COATED ORAL DAILY
Qty: 90 TABLET | Refills: 1 | Status: SHIPPED | OUTPATIENT
Start: 2024-12-12

## 2024-12-12 NOTE — PROGRESS NOTES
Urine tox: 11/2023  Contract: 11/2023    Per prior:  Tobacco dependence:  quit cigs but he does vape  Hx of compression fracture L1 vertebra 10 years ago from falling off 10-15 ft deck; no surgical treatment; intermittent pain flares at times  Back pain: feels standard back pain; carries pistol on R side and feels it could be strain    Vitals:   BP (!) 158/90   Pulse 80   Temp 97.5 °F (36.4 °C)   Resp 20   Wt (!) 142.2 kg (313 lb 9.6 oz)   SpO2 98%   BMI 39.20 kg/m²   Wt Readings from Last 3 Encounters:   12/12/24 (!) 142.2 kg (313 lb 9.6 oz)   11/26/24 (!) 143.2 kg (315 lb 12.8 oz)   11/07/23 (!) 137.9 kg (304 lb)       PE:  Constitutional - alert, well appearing, and in no distress  Eyes - extraocular eye movements intact, left eye normal, right eye normal, no conjunctivitis noted  Neck - symmetric, no obvious masses noted  Respiratory- clear to auscultation, no wheezes, rales or rhonchi, symmetric air entry; no increased work of breathing  Cardiovascular - normal rate, regular rhythm, normal S1, S2, no murmurs, rubs, clicks or gallops  Extremities - no edema noted  Abdomen - soft, nontender, nondistended  Skin - normal coloration and turgor, no rashes, no suspicious skin lesions noted  Psychiatric - alert, oriented; normal mood, behavior, speech, dress    A / P:     Diagnosis Orders   1. Essential hypertension  amLODIPine (NORVASC) 2.5 MG tablet    EKG 12 Lead      2. Current moderate episode of major depressive disorder, unspecified whether recurrent (HCC)  PARoxetine (PAXIL) 10 MG tablet      3. Attention deficit hyperactivity disorder (ADHD), unspecified ADHD type        4. Hyperlipidemia, unspecified hyperlipidemia type            Start norvasc 2.5mg for high BP  Resume paxil 10mg at this time  EKG today rsr  Work on diet and exercise      RTO: Return in about 2 months (around 2/12/2025) for Medication check.        An electronic signature was used to authenticate this note.  ---- Nain Rose,

## 2025-02-09 DIAGNOSIS — I10 ESSENTIAL HYPERTENSION: ICD-10-CM

## 2025-02-10 RX ORDER — AMLODIPINE BESYLATE 2.5 MG/1
2.5 TABLET ORAL DAILY
Qty: 90 TABLET | Refills: 2 | OUTPATIENT
Start: 2025-02-10

## 2025-03-04 ENCOUNTER — OFFICE VISIT (OUTPATIENT)
Dept: FAMILY MEDICINE CLINIC | Age: 30
End: 2025-03-04

## 2025-03-04 VITALS
BODY MASS INDEX: 39.17 KG/M2 | WEIGHT: 315 LBS | RESPIRATION RATE: 16 BRPM | HEIGHT: 75 IN | DIASTOLIC BLOOD PRESSURE: 88 MMHG | TEMPERATURE: 98.2 F | OXYGEN SATURATION: 97 % | SYSTOLIC BLOOD PRESSURE: 148 MMHG | HEART RATE: 95 BPM

## 2025-03-04 DIAGNOSIS — J40 SINOBRONCHITIS: ICD-10-CM

## 2025-03-04 DIAGNOSIS — H66.003 ACUTE SUPPURATIVE OTITIS MEDIA OF BOTH EARS WITHOUT SPONTANEOUS RUPTURE OF TYMPANIC MEMBRANES, RECURRENCE NOT SPECIFIED: Primary | ICD-10-CM

## 2025-03-04 DIAGNOSIS — R09.81 CONGESTION OF NASAL SINUS: ICD-10-CM

## 2025-03-04 DIAGNOSIS — J32.9 SINOBRONCHITIS: ICD-10-CM

## 2025-03-04 LAB
INFLUENZA A ANTIGEN, POC: NORMAL
INFLUENZA B ANTIGEN, POC: NORMAL
Lab: NORMAL
PERFORMING INSTRUMENT: NORMAL
QC PASS/FAIL: NORMAL
SARS-COV-2, POC: NORMAL

## 2025-03-04 RX ORDER — AZITHROMYCIN 250 MG/1
TABLET, FILM COATED ORAL
Qty: 6 TABLET | Refills: 0 | Status: SHIPPED | OUTPATIENT
Start: 2025-03-04 | End: 2025-03-14

## 2025-03-04 RX ORDER — BENZONATATE 100 MG/1
100 CAPSULE ORAL 3 TIMES DAILY PRN
Qty: 30 CAPSULE | Refills: 0 | Status: SHIPPED | OUTPATIENT
Start: 2025-03-04 | End: 2025-03-14

## 2025-03-04 RX ORDER — BROMPHENIRAMINE MALEATE, PSEUDOEPHEDRINE HYDROCHLORIDE, AND DEXTROMETHORPHAN HYDROBROMIDE 2; 30; 10 MG/5ML; MG/5ML; MG/5ML
5 SYRUP ORAL 4 TIMES DAILY PRN
Qty: 118 ML | Refills: 0 | Status: SHIPPED | OUTPATIENT
Start: 2025-03-04

## 2025-03-04 SDOH — ECONOMIC STABILITY: FOOD INSECURITY: WITHIN THE PAST 12 MONTHS, YOU WORRIED THAT YOUR FOOD WOULD RUN OUT BEFORE YOU GOT MONEY TO BUY MORE.: OFTEN TRUE

## 2025-03-04 SDOH — ECONOMIC STABILITY: FOOD INSECURITY: WITHIN THE PAST 12 MONTHS, THE FOOD YOU BOUGHT JUST DIDN'T LAST AND YOU DIDN'T HAVE MONEY TO GET MORE.: OFTEN TRUE

## 2025-03-04 ASSESSMENT — PATIENT HEALTH QUESTIONNAIRE - PHQ9
7. TROUBLE CONCENTRATING ON THINGS, SUCH AS READING THE NEWSPAPER OR WATCHING TELEVISION: NEARLY EVERY DAY
8. MOVING OR SPEAKING SO SLOWLY THAT OTHER PEOPLE COULD HAVE NOTICED. OR THE OPPOSITE, BEING SO FIGETY OR RESTLESS THAT YOU HAVE BEEN MOVING AROUND A LOT MORE THAN USUAL: NOT AT ALL
5. POOR APPETITE OR OVEREATING: MORE THAN HALF THE DAYS
3. TROUBLE FALLING OR STAYING ASLEEP: NOT AT ALL
4. FEELING TIRED OR HAVING LITTLE ENERGY: NEARLY EVERY DAY
SUM OF ALL RESPONSES TO PHQ QUESTIONS 1-9: 17
SUM OF ALL RESPONSES TO PHQ QUESTIONS 1-9: 17
1. LITTLE INTEREST OR PLEASURE IN DOING THINGS: NEARLY EVERY DAY
9. THOUGHTS THAT YOU WOULD BE BETTER OFF DEAD, OR OF HURTING YOURSELF: NOT AT ALL
SUM OF ALL RESPONSES TO PHQ QUESTIONS 1-9: 17
6. FEELING BAD ABOUT YOURSELF - OR THAT YOU ARE A FAILURE OR HAVE LET YOURSELF OR YOUR FAMILY DOWN: NEARLY EVERY DAY
10. IF YOU CHECKED OFF ANY PROBLEMS, HOW DIFFICULT HAVE THESE PROBLEMS MADE IT FOR YOU TO DO YOUR WORK, TAKE CARE OF THINGS AT HOME, OR GET ALONG WITH OTHER PEOPLE: VERY DIFFICULT
SUM OF ALL RESPONSES TO PHQ QUESTIONS 1-9: 17
2. FEELING DOWN, DEPRESSED OR HOPELESS: NEARLY EVERY DAY

## 2025-03-04 NOTE — PROGRESS NOTES
azithromycin (ZITHROMAX) 250 MG tablet    brompheniramine-pseudoephedrine-DM 2-30-10 MG/5ML syrup    benzonatate (TESSALON) 100 MG capsule            RTO: Return if symptoms worsen or fail to improve.        An electronic signature was used to authenticate this note.  ---- Nain Rose MD on 3/4/2025 at 3:44 PM

## 2025-03-04 NOTE — PATIENT INSTRUCTIONS
two frozen meals for Saturday and Sunday.  Phone Number: [phone]  [facility]-MEALS:  What they offer: Home delivered to [facility] residents. Donation for meals.   Phone Number: [phone]                 [facility] MEALS   [phone]    What they offer:  Home delivered meals to [facility] residents with no age restriction; hot meals delivered Monday thru Friday and have frozen meals for weekend; cost is $8.60/meal for special diet and $8.35/meal for regular diet;   Offer sliding fee scale as well for discounted meals  [facility] HOME DELIVERED MEALS  Serves [facility], residents aged 60+ that cannot drive  [phone]    [facility] Food Box   Phone: [phone]

## 2025-05-14 ENCOUNTER — HOSPITAL ENCOUNTER (EMERGENCY)
Age: 30
Discharge: HOME | End: 2025-05-14
Attending: STUDENT IN AN ORGANIZED HEALTH CARE EDUCATION/TRAINING PROGRAM
Payer: COMMERCIAL

## 2025-05-14 VITALS
SYSTOLIC BLOOD PRESSURE: 162 MMHG | OXYGEN SATURATION: 100 % | RESPIRATION RATE: 16 BRPM | HEART RATE: 98 BPM | TEMPERATURE: 98.1 F | DIASTOLIC BLOOD PRESSURE: 92 MMHG

## 2025-05-14 DIAGNOSIS — S01.01XA LACERATION OF SCALP, INITIAL ENCOUNTER: Primary | ICD-10-CM

## 2025-05-14 PROCEDURE — 12002 RPR S/N/AX/GEN/TRNK2.6-7.5CM: CPT

## 2025-05-14 PROCEDURE — 99282 EMERGENCY DEPT VISIT SF MDM: CPT

## 2025-05-14 ASSESSMENT — LIFESTYLE VARIABLES
HOW OFTEN DO YOU HAVE A DRINK CONTAINING ALCOHOL: NEVER
HOW MANY STANDARD DRINKS CONTAINING ALCOHOL DO YOU HAVE ON A TYPICAL DAY: PATIENT DOES NOT DRINK

## 2025-05-15 ASSESSMENT — ENCOUNTER SYMPTOMS
ROS SKIN COMMENTS: SCALP LACERATION
ABDOMINAL PAIN: 0
COUGH: 0
SHORTNESS OF BREATH: 0
VOMITING: 0
BACK PAIN: 0

## 2025-05-15 NOTE — DISCHARGE INSTRUCTIONS
You will need stitches removed in 10 to 14 days.  He may either come here or go to urgent care or follow-up with your doctor.

## 2025-05-15 NOTE — ED PROVIDER NOTES
HPI   Patient presents to emergency department for evaluation of laceration to the scalp.  He had a trip and fall earlier today hitting his head on the side of the door frame.  He did not pass out.  No vomiting.  His laceration is 3 cm long.  There was bleeding but it since stopped.  His last tetanus shot was a year ago  Review of Systems   Constitutional:  Negative for fever.   HENT:  Negative for congestion.    Respiratory:  Negative for cough and shortness of breath.    Cardiovascular:  Negative for leg swelling.   Gastrointestinal:  Negative for abdominal pain and vomiting.   Musculoskeletal:  Negative for back pain and neck pain.   Skin:         Scalp laceration        Physical Exam  Vitals and nursing note reviewed.   Constitutional:       Appearance: He is well-developed.   HENT:      Head: Normocephalic and atraumatic.      Comments: To the right parietal scalp there is a 3 cm linear laceration.  No active bleeding.    No hemotympanum, no sheridan signs, no raccoon eyes.  No hyphema.     Mouth/Throat:      Pharynx: Oropharynx is clear.   Eyes:      Conjunctiva/sclera: Conjunctivae normal.   Neck:      Comments: No C-spine tenderness to palpation.  Cardiovascular:      Rate and Rhythm: Normal rate and regular rhythm.      Heart sounds: Normal heart sounds. No murmur heard.  Pulmonary:      Effort: Pulmonary effort is normal. No respiratory distress.      Breath sounds: Normal breath sounds. No wheezing.   Abdominal:      Palpations: Abdomen is soft.      Tenderness: There is no abdominal tenderness. There is no guarding or rebound.   Musculoskeletal:         General: No tenderness or deformity.      Cervical back: Normal range of motion and neck supple.   Skin:     General: Skin is warm and dry.   Neurological:      Mental Status: He is alert and oriented to person, place, and time.      Cranial Nerves: No cranial nerve deficit.      Coordination: Coordination normal.          Procedures     MDM  Patient is

## 2025-05-30 ENCOUNTER — OFFICE VISIT (OUTPATIENT)
Dept: FAMILY MEDICINE CLINIC | Age: 30
End: 2025-05-30

## 2025-05-30 VITALS
BODY MASS INDEX: 39.17 KG/M2 | HEIGHT: 75 IN | SYSTOLIC BLOOD PRESSURE: 122 MMHG | RESPIRATION RATE: 18 BRPM | WEIGHT: 315 LBS | TEMPERATURE: 98.1 F | HEART RATE: 72 BPM | DIASTOLIC BLOOD PRESSURE: 74 MMHG | OXYGEN SATURATION: 98 %

## 2025-05-30 DIAGNOSIS — Z48.02 ENCOUNTER FOR STAPLE REMOVAL: Primary | ICD-10-CM

## 2025-05-30 NOTE — PATIENT INSTRUCTIONS
[place] [employer] - Financial Resources*  (Call [employer]/211 if need more resources.)       Utility:  [employer]  What they offer: Limited assistance to restore/ prevent utility disconnection.  Phone Number: [phone]  Address: [facility] [place] [address]  Website: [employer]  [facility] Action Program  Utility assistance  [phone]  [facility] Community Action Partnership  Utility assistance   [phone]  Community Action Agency of [facility]  Utility assistance  [phone]  Financial or Medical  HELP NETWORK OF [facility]:  What they do: Provides 24-hr, 7 days a week access to information on community resources for financial help. [facility] AND [facility]  Phone: 211 or [phone]            [facility] JOB AND FAMILY SERVICES:  MAIN [facility] LINE FOR ALL [facility]: [phone]  What they do: [place] works first with temporary cash assistance if there are children in household.   [facility] DJFS: [address] [place] [address]  Phone: [phone], [phone]  [facility] DJFS: [address] [place] [address]  Phone: [phone]  [facility] DJFS: [address] [place] [address]  Phone: [phone]  Website: s.[place].[facility]    [employer] Financial Assistance  What they offer: Assistance with [employer] bills  Phone: [phone], option #5   Medications:  [facility]  What they offer: [employer] tracks prescription drug prices and provides free drug coupons for discounts on medications.  Website: https://www.[employer]  [employer]  What they offer: [employer] offers free information on medications and healthcare cost savings programs including prescription assistance programs, coupons, and discount programs.  Helpline: 124.636.5366  Website: https://www.[employer]BubbleNoiseeds.org    [employer]  What they offer: Information about free and low-cost medicine programs.  Website:

## 2025-05-30 NOTE — PROGRESS NOTES
Chief Complaint:   Suture / Staple Removal (Staple removal on head , )    History of Present Illness   Source of history provided by:  patient.      Colin Luz is a 30 y.o. old male presenting for removal of 3 staples from top of head, which were placed 16 days ago.  Pt denies any drainage from the wound site, pain or bleeding from the area, or wound dehiscence. No fever or chills noted per pt. Pt states the wound is healing well without any complications.     ROS    Unless otherwise stated in this report or unable to obtain because of the patient's clinical or mental status as evidenced by the medical record, this patients's positive and negative responses for Review of Systems, constitutional, psych, eyes, ENT, cardiovascular, respiratory, gastrointestinal, neurological, genitourinary, musculoskeletal, integument systems and systems related to the presenting problem are either stated in the preceding or were not pertinent or were negative for the symptoms and/or complaints related to the medical problem.    Social History:  reports that he has quit smoking. His smoking use included cigarettes. He has a 1.5 pack-year smoking history. He has never used smokeless tobacco. He reports that he does not drink alcohol and does not use drugs.  Family History: family history includes Arthritis in his father; Breast Cancer in his mother; Diabetes in his father; High Blood Pressure in his father and paternal grandfather; High Cholesterol in his father; Mult Sclerosis in his mother; Pancreatic Cancer in his maternal grandmother.  Allergies: Patient has no known allergies.    Physical Exam         VS:  /74 (BP Site: Right Upper Arm, Patient Position: Sitting)   Pulse 72   Temp 98.1 °F (36.7 °C)   Resp 18   Ht 1.905 m (6' 3\")   Wt (!) 144.3 kg (318 lb 1.6 oz)   SpO2 98%   BMI 39.76 kg/m²    Oxygen Saturation Interpretation: Normal.    Constitutional:  Alert, development consistent with age.  Neck:  Normal ROM.

## 2025-08-13 ENCOUNTER — OFFICE VISIT (OUTPATIENT)
Dept: FAMILY MEDICINE CLINIC | Age: 30
End: 2025-08-13
Payer: COMMERCIAL

## 2025-08-13 VITALS
HEIGHT: 75 IN | HEART RATE: 83 BPM | SYSTOLIC BLOOD PRESSURE: 136 MMHG | DIASTOLIC BLOOD PRESSURE: 84 MMHG | WEIGHT: 309 LBS | BODY MASS INDEX: 38.42 KG/M2 | RESPIRATION RATE: 18 BRPM | TEMPERATURE: 97.2 F | OXYGEN SATURATION: 97 %

## 2025-08-13 DIAGNOSIS — F90.9 ATTENTION DEFICIT HYPERACTIVITY DISORDER (ADHD), UNSPECIFIED ADHD TYPE: ICD-10-CM

## 2025-08-13 DIAGNOSIS — I10 ESSENTIAL HYPERTENSION: Primary | ICD-10-CM

## 2025-08-13 DIAGNOSIS — E78.5 HYPERLIPIDEMIA, UNSPECIFIED HYPERLIPIDEMIA TYPE: ICD-10-CM

## 2025-08-13 DIAGNOSIS — F33.41 RECURRENT MAJOR DEPRESSIVE DISORDER, IN PARTIAL REMISSION: ICD-10-CM

## 2025-08-13 PROBLEM — F32.1 CURRENT MODERATE EPISODE OF MAJOR DEPRESSIVE DISORDER, UNSPECIFIED WHETHER RECURRENT (HCC): Status: ACTIVE | Noted: 2025-08-13

## 2025-08-13 PROBLEM — F32.1 CURRENT MODERATE EPISODE OF MAJOR DEPRESSIVE DISORDER, UNSPECIFIED WHETHER RECURRENT (HCC): Status: RESOLVED | Noted: 2025-08-13 | Resolved: 2025-08-13

## 2025-08-13 PROCEDURE — 3075F SYST BP GE 130 - 139MM HG: CPT | Performed by: FAMILY MEDICINE

## 2025-08-13 PROCEDURE — 3079F DIAST BP 80-89 MM HG: CPT | Performed by: FAMILY MEDICINE

## 2025-08-13 PROCEDURE — 99214 OFFICE O/P EST MOD 30 MIN: CPT | Performed by: FAMILY MEDICINE

## 2025-08-13 RX ORDER — AMLODIPINE BESYLATE 2.5 MG/1
2.5 TABLET ORAL DAILY
Qty: 90 TABLET | Refills: 1 | Status: SHIPPED | OUTPATIENT
Start: 2025-08-13

## 2025-08-25 ENCOUNTER — OFFICE VISIT (OUTPATIENT)
Dept: FAMILY MEDICINE CLINIC | Age: 30
End: 2025-08-25
Payer: COMMERCIAL

## 2025-08-25 VITALS
BODY MASS INDEX: 39.1 KG/M2 | DIASTOLIC BLOOD PRESSURE: 90 MMHG | WEIGHT: 312.8 LBS | HEART RATE: 86 BPM | OXYGEN SATURATION: 97 % | TEMPERATURE: 98.3 F | SYSTOLIC BLOOD PRESSURE: 142 MMHG

## 2025-08-25 DIAGNOSIS — D17.9 LIPOMA, UNSPECIFIED SITE: Primary | ICD-10-CM

## 2025-08-25 PROCEDURE — 3080F DIAST BP >= 90 MM HG: CPT | Performed by: PHYSICIAN ASSISTANT

## 2025-08-25 PROCEDURE — 99213 OFFICE O/P EST LOW 20 MIN: CPT | Performed by: PHYSICIAN ASSISTANT

## 2025-08-25 PROCEDURE — 3077F SYST BP >= 140 MM HG: CPT | Performed by: PHYSICIAN ASSISTANT
